# Patient Record
Sex: FEMALE | Race: WHITE | Employment: OTHER | URBAN - METROPOLITAN AREA
[De-identification: names, ages, dates, MRNs, and addresses within clinical notes are randomized per-mention and may not be internally consistent; named-entity substitution may affect disease eponyms.]

---

## 2022-04-11 ENCOUNTER — HOSPITAL ENCOUNTER (OUTPATIENT)
Dept: PHYSICAL THERAPY | Age: 57
Discharge: HOME | End: 2022-04-11
Payer: OTHER GOVERNMENT

## 2022-04-11 PROCEDURE — 97161 PT EVAL LOW COMPLEX 20 MIN: CPT

## 2022-04-11 PROCEDURE — 97110 THERAPEUTIC EXERCISES: CPT

## 2022-04-11 NOTE — PROGRESS NOTES
PT DAILY TREATMENT NOTE/HIP [id number]    Patient Name: Jeanine Glasgow  Date:2022  : 1965  [x]  Patient  Verified  Payor: LAUREN / Plan: Gamaliel Cole 74 / Product Type: Ricardo Andersen /    In time:1350  Out time:1450  Total Treatment Time (min): 60  Visit #: 1 of 16    Medicare/BCBS Only   Total Timed Codes (min):  23 1:1 Treatment Time:  60     Treatment Area: Pain in right hip [M25.551]    SUBJECTIVE  Pain Level (0-10 scale): 3  [x]constant []intermittent []improving []worsening []no change since onset    Any medication changes, allergies to medications, adverse drug reactions, diagnosis change, or new procedure performed?: [x] No    [] Yes (see summary sheet for update)  Subjective functional status/changes:       Patient presents with c/o right hip pain that is becoming more frequent over the last few months with no know mechanism of injury. Patient describes pain as ache, sharp. Pain is worse in PM. Denies numbness/tingling. Denies popping/clicking. Aggravating factors: stairs, sleeping. Alleviating factors: None. Denies red flags: SOB, chest pain, dizziness/lightheadedness, blurred/double vision, HA, chills/fevers, night sweats, change in bowel/bladder control, abdominal pain, difficulty swallowing, slurred speech, unexplained weight gain/loss, nausea, vomiting. PMHx: HTN, Visual impaired. Surgical Hx: . Social Hx: Lives with spouse in a single story with 3 stairs to enter home, work status: Retired. PLOF: walking, gardening yard work.  Diagnostic Imaging: Right hip xray: results unknown    OBJECTIVE/EXAMINATION    37 min [x]Eval                  []Re-Eval     23 min Therapeutic Exercise:  [x] See flow sheet :   Rationale: increase ROM, increase strength and decrease pain to improve the patients ability to complete ADLs       With   [x] TE   [] TA   [] neuro   [] other: Patient Education: [x] Review HEP    [] Progressed/Changed HEP based on:   [] positioning   [] body mechanics   [] transfers   [] heat/ice application    [] other:        Physical Therapy Evaluation- Hip    Posture: WNLs     Gait:  [] Normal    [] Abnormal    [x] Antalgic    [] NWB    Device:None      Describe:Patient ambulates with independence demonstrating an antalgic gait pattern. ROM/Strength        AROM               Strength (1-5)  Hip Left Right Left Right   Flexion   4 4-   Extension       Abduction   4 4-   Adduction   4 4   ER 45 45 4- 4   IR 45 30 4- 4   Knee Left Right Left Right   Extension   4 4   Flexion   4 4                                   Palpation  [] Min  [x] Mod  [] Severe    Location: right hip flexors/adductors    Optional Tests  Samir/ Suhas Test: [] Neg    [x] Pos  Hugo Test:  [] Neg    [x] Pos  Trendelenberg:  [x] Neg    [] Pos [] Left    [] Right  OberTest:   [x] Neg    [] Pos  Ely's Test:  [x] Neg    [] Pos    Other tests/ comments:      Pain Level (0-10 scale) post treatment: 1-2    ASSESSMENT/Changes in Function:     Patient presents with c/o right hip pain that is becoming more frequent over the last few months with no know mechanism of injury. She has decreased strength in bilateral LEs right more than left. She has reduced balance on the right secondary to right hip weakness. Patient ambulates with independence demonstrating an antalgic gait pattern as she had reduced stance time on the right and decreased step length on the left. Patient presentation is consistent with hip flexor tendinopathy. Patient will benefit from skilled PT services to modify and progress therapeutic interventions, address functional mobility deficits, address ROM deficits, address strength deficits, analyze and address soft tissue restrictions, analyze and cue movement patterns, analyze and modify body mechanics/ergonomics and assess and modify postural abnormalities to attain her goals.      [x]  See Plan of Care  []  See progress note/recertification  []  See Discharge Summary         Progress towards goals / Updated goals:  See POC    PLAN  []  Upgrade activities as tolerated     [x]  Continue plan of care  []  Update interventions per flow sheet       []  Discharge due to:_  []  Other:_      Tra Armas PT, DPT 4/11/2022  1:42 PM

## 2022-04-11 NOTE — PROGRESS NOTES
[facility] at [address] [place] [address] Drive: [phone]   Fax: [phone]  PLAN OF CARE / [address] [place] [address] PHYSICAL THERAPY SERVICES  Patient Name: Leslie Mcdonough : 1965   Medical   Diagnosis: Pain in right hip [M25.551] Treatment Diagnosis: Right hip pain   Onset Date: Chronic     Referral Source: Hilaria Nunez NP Start of Care [facility]): 2022   Prior Hospitalization: See medical history Provider #: [id number]   Prior Level of Function: walking, gardening yard work   Comorbidities: HTN, Visual impaired   Medications: Verified on Patient Summary List   The Plan of Care and following information is based on the information from the initial evaluation.   ===========================================================================================  Assessment / key information:  Leslie Mcdonough is a 64 y.o. female presents with  c/o right hip pain that is becoming more frequent over the last few months with no known mechanism of injury. She has decreased strength in bilateral LEs right more than left. She has reduced balance on the right secondary to right hip weakness. Patient ambulates with independence demonstrating an antalgic gait pattern as she had reduced stance time on the right and decreased step length on the left. Patient presentation is consistent with hip flexor tendinopathy. Patient will benefit from skilled PT services to modify and progress therapeutic interventions, address functional mobility deficits, address ROM deficits, address strength deficits, analyze and address soft tissue restrictions, analyze and cue movement patterns, analyze and modify body mechanics/ergonomics and assess and modify postural abnormalities to attain her goals. .      ===========================================================================================  Eval Complexity: History MEDIUM  Complexity : 1-2 comorbidities / personal factors will impact the outcome/ POC ;  Examination  LOW Complexity : 1-2 Standardized tests and measures addressing body structure, function, activity limitation and / or participation in recreation ; Presentation LOW Complexity : Stable, uncomplicated ;  Decision Making LOW Complexity : FOTO score of ; Overall Complexity LOW   Problem List: pain affecting function, decrease ROM, decrease strength, impaired gait/ balance, decrease ADL/ functional abilitiies, decrease activity tolerance, decrease flexibility/ joint mobility and decrease transfer abilities   Treatment Plan may include any combination of the following: Therapeutic exercise, Therapeutic activities, Neuromuscular re-education, Physical agent/modality, Gait/balance training, Manual therapy, Patient education, Self Care training, Functional mobility training, Home safety training and Stair training  Patient / Family readiness to learn indicated by: asking questions, trying to perform skills and interest  Persons(s) to be included in education: patient (P)  Barriers to Learning/Limitations: no  Measures taken if barriers to learning:   Patient Goal (s): \"reduce pain\"   Patient self reported health status: good  Rehabilitation Potential: good    Goals:  Short Term Goals: To be accomplished in 4 weeks:   Patient will report compliance with HEP 1x/day to aid in rehabilitation program.   Status at IE:Provided initial HEP   Current:Same as IE     Long Term Goals: To be accomplished in 8 weeks:   Patient will increase bilateral LE strength to 5/5 MMT throughout to aid in completion of ADLs. Status at IE:4-/5   Current:Same as IE     Patient will report pain no greater than 1-2/10 throughout entire day to aid in completion of ADLs   Status at IE:3-7/10   Current:Same as IE     Patent will perform 10 sit<>stands with 11# pain-free to demonstrate improvement in status and aid and completion of ADLs.    Status at IE:3/10   Current:Same as IE     Patient will improve FOTO score to 88 points to demonstrate improvement in functional status. Status at IE:FOTO score = 85 (an established functional score where 100 = no disability)   Current:Same as IE    Frequency / Duration:   Patient to be seen 2  times per week for 8  weeks:  Patient / Caregiver education and instruction: self care and exercises      Therapist Signature: Jaja Holland PT, DPT Date: 4/62/4129   Certification Period: NA Time: 3:10 PM   ===========================================================================================  I certify that the above Physical Therapy Services are being furnished while the patient is under my care. I agree with the treatment plan and certify that this therapy is necessary. Physician Signature:        Date:       Time:     Please sign and return to [facility] or you may fax the signed copy to [phone]. Thank you.

## 2022-04-14 ENCOUNTER — HOSPITAL ENCOUNTER (OUTPATIENT)
Dept: PHYSICAL THERAPY | Age: 57
Discharge: HOME | End: 2022-04-14
Payer: OTHER GOVERNMENT

## 2022-04-14 PROCEDURE — 97112 NEUROMUSCULAR REEDUCATION: CPT

## 2022-04-14 PROCEDURE — 97110 THERAPEUTIC EXERCISES: CPT

## 2022-04-14 PROCEDURE — 97530 THERAPEUTIC ACTIVITIES: CPT

## 2022-04-14 NOTE — PROGRESS NOTES
PT DAILY TREATMENT NOTE - [facility]     Patient Name: Sharon Allison  Date:2022  : 1965  [x]  Patient  Verified  Payor: LAUREN / Plan: Gamaliel Cole 74 / Product Type: [address] [place] [address] /    In LYOJ:8495  Out time:0850  Total Treatment Time (min): 54  Total Timed Codes (min): 47   Visit #: 2 of 16    Treatment Area: Pain in right hip [M25.551]    SUBJECTIVE  Pain Level (0-10 scale): 0-1  Any medication changes, allergies to medications, adverse drug reactions, diagnosis change, or new procedure performed?: [x] No    [] Yes (see summary sheet for update)  Subjective functional status/changes:   [] No changes reported  Patient rpeorts that    OBJECTIVE    30 min Therapeutic Exercise:  [x] See flow sheet :   Rationale: increase ROM, increase strength and decrease pain to improve the patients ability to complete ADLs    14 min Therapeutic Activity:  [x]  See flow sheet :   Rationale: increase ROM, increase strength and improve coordination  to improve the patients ability to complete ADLs     10 min Neuromuscular Re-education:  [x]  See flow sheet :   Rationale: improve coordination, improve balance and increase proprioception  to improve the patients ability to complete ADLs        With   [x] TE   [] TA   [] neuro   [] other: Patient Education: [x] Review HEP    [] Progressed/Changed HEP based on:   [] positioning   [] body mechanics   [] transfers   [] heat/ice application    [] other:      Other Objective/Functional Measures: NA     Pain Level (0-10 scale) post treatment: 0    ASSESSMENT/Changes in Function: Patient responds well to treatment session. Patient required cues for activity pacing to promote target muscle recovery. She was challenged with single limb weight bearing exercise secondary to reduced strength of right hip and LE. Reviewed HEP to promote good carryover at home.  No adverse effects were noted from today's treatment session    Patient will continue to benefit from skilled PT services to modify and progress therapeutic interventions, address functional mobility deficits, address ROM deficits, address strength deficits, analyze and address soft tissue restrictions, analyze and cue movement patterns, analyze and modify body mechanics/ergonomics, assess and modify postural abnormalities, address imbalance and instruct in home and community integration to attain remaining goals. []  See Plan of Care  []  See progress note/recertification  []  See Discharge Summary         Progress towards goals / Updated goals:  Short Term Goals: To be accomplished in 4 weeks:                 Patient will report compliance with HEP 1x/day to aid in rehabilitation program.                 Status at IE:Provided initial HEP                 Current: In-progress, reviewed HEP, 4/14/2022     Long Term Goals: To be accomplished in 8 weeks:                 Patient will increase bilateral LE strength to 5/5 MMT throughout to aid in completion of ADLs. Status at IE:4-/5                 Current:Same as IE                    Patient will report pain no greater than 1-2/10 throughout entire day to aid in completion of ADLs                 Status at IE:3-7/10                 Current:Same as IE                    Patent will perform 10 sit<>stands with 11# pain-free to demonstrate improvement in status and aid and completion of ADLs. Status at IE:3/10                 Current:Same as IE                    Patient will improve FOTO score to 88 points to demonstrate improvement in functional status.                  Status at IE:FOTO score = 85 (an established functional score where 100 = no disability)                 Current:Same as IE    PLAN  []  Upgrade activities as tolerated     [x]  Continue plan of care  []  Update interventions per flow sheet       []  Discharge due to:_  []  Other:_      Arlette Hoskins, PT, DPT 4/14/2022  7:57 AM    Future Appointments   Date Time Provider [facility]   4/14/2022  8:00 AM Aleene Billing, PT [facility]   4/18/2022  2:30 PM Aleene Billing, PT [facility]   4/21/2022  8:00 AM Wylene Milder, PT [facility]   4/25/2022  9:30 AM Aleene Billing, PT [facility]   4/28/2022  8:00 AM Wylene Milder, PT [facility]   5/6/2022  8:00 AM Wylene Milder, PT [facility]   5/9/2022  8:00 AM Aleene Billing, PT [facility]   5/12/2022  8:00 AM Aleene Billing, PT [facility]   5/16/2022  8:00 AM Aleene Billing, PT [facility]   5/19/2022  8:00 AM Aleene Billing, PT [facility]   5/23/2022  8:00 AM Aleene Billing, PT [facility]   5/26/2022  8:00 AM Aleene Billing, PT [facility]

## 2022-04-18 ENCOUNTER — HOSPITAL ENCOUNTER (OUTPATIENT)
Dept: PHYSICAL THERAPY | Age: 57
Discharge: HOME | End: 2022-04-18
Payer: OTHER GOVERNMENT

## 2022-04-18 PROCEDURE — 97110 THERAPEUTIC EXERCISES: CPT

## 2022-04-18 PROCEDURE — 97112 NEUROMUSCULAR REEDUCATION: CPT

## 2022-04-18 PROCEDURE — 97530 THERAPEUTIC ACTIVITIES: CPT

## 2022-04-18 NOTE — PROGRESS NOTES
PT DAILY TREATMENT NOTE - [facility]     Patient Name: Alida Smith  Date:2022  : 1965  [x]  Patient  Verified  Payor:  / Plan: Fabricio Tyler / Product Type:  /    In time:1430  Out time:1525  Total Treatment Time (min): 55  Total Timed Codes (min): 55     Visit #: 3 of 16    Treatment Area: Pain in right hip [M25.551]    SUBJECTIVE  Pain Level (0-10 scale): 1  Any medication changes, allergies to medications, adverse drug reactions, diagnosis change, or new procedure performed?: [x] No    [] Yes (see summary sheet for update)  Subjective functional status/changes:   [] No changes reported    Patient reports that she had right hip soreness after last soreness but it went away after a day. OBJECTIVE    30 min Therapeutic Exercise:  [x] See flow sheet :   Rationale: increase ROM, increase strength and decrease pain to improve the patients ability to complete ADLs    15 min Therapeutic Activity:  [x]  See flow sheet :   Rationale: increase ROM, increase strength and improve coordination  to improve the patients ability to complete ADLs     10 min Neuromuscular Re-education:  [x]  See flow sheet :   Rationale: improve coordination, improve balance and increase proprioception  to improve the patients ability to complete ADLs        With   [x] TE   [] TA   [] neuro   [] other: Patient Education: [x] Review HEP    [] Progressed/Changed HEP based on:   [] positioning   [] body mechanics   [] transfers   [] heat/ice application    [] other:      Other Objective/Functional Measures: NA     Pain Level (0-10 scale) post treatment: 0    ASSESSMENT/Changes in Function: Patient responds well to treatment session. Patient required cues to recall exercise parameters. She responded well to exercise as she had no increase in symptoms. She was challenged with posterior chain strengthening exercise. Will advance exercise next visit by introducing leg press.  No adverse effects were noted from today's treatment session    Patient will continue to benefit from skilled PT services to modify and progress therapeutic interventions, address functional mobility deficits, address ROM deficits, address strength deficits, analyze and address soft tissue restrictions, analyze and cue movement patterns, analyze and modify body mechanics/ergonomics, assess and modify postural abnormalities, address imbalance and instruct in home and community integration to attain remaining goals. []  See Plan of Care  []  See progress note/recertification  []  See Discharge Summary         Progress towards goals / Updated goals:  Short Term Goals: To be accomplished in 4 weeks:                 CQHIFPD will report compliance with HEP 1x/day to aid in rehabilitation program.                 Status at IE:Provided initial HEP                 PXDRULP: In-progress, reviewed HEP, 4/14/2022     Long Term Goals: To be accomplished in 8 weeks:                 AEHOYDV will increase bilateral LE strength to 5/5 MMT throughout to aid in completion of ADLs.                Status at IE:4-/5                 Current:Same as IE                    Patient will report pain no greater than 1-2/10 throughout entire day to aid in completion of ADLs                 Status at IE:3-7/10                 Current:Same as IE                    Patent will perform 10 sit<>stands with 11# pain-free to demonstrate improvement in status and aid and completion of ADLs.                Status at IE:3/10                 Current:Same as IE                    Patient will improve FOTO score to 88 points to demonstrate improvement in functional status.                  Status at IE:FOTO score = 85 (an established functional score where 100 = no disability)                 Current:Same as IE    PLAN  []  Upgrade activities as tolerated     [x]  Continue plan of care  []  Update interventions per flow sheet       []  Discharge due to:_  []  Other:Michael Culver Lamont Rodriguez, CONSUELO 4/18/2022  3:01 PM    Future Appointments   Date Time Provider Marlo Leesa   4/21/2022  8:00 AM Eleanor Vega, PT [facility]   4/25/2022  9:30 AM Truefranklin Mccain, PT [facility]   4/28/2022  8:00 AM Mitch New, PT [facility]   5/6/2022  8:00 AM Mitch New, PT [facility]   5/9/2022  8:00 AM Truett Champion, PT [facility]   5/12/2022  8:00 AM Truett Champion, PT [facility]   5/16/2022  8:00 AM Truett Champion, PT [facility]   5/19/2022  8:00 AM Truett Champion, PT [facility]   5/23/2022  8:00 AM Truett Champion, PT [facility]   5/26/2022  8:00 AM Truett Champion, PT [facility]

## 2022-04-21 ENCOUNTER — HOSPITAL ENCOUNTER (OUTPATIENT)
Dept: PHYSICAL THERAPY | Age: 57
Discharge: HOME | End: 2022-04-21
Payer: OTHER GOVERNMENT

## 2022-04-21 PROCEDURE — 97110 THERAPEUTIC EXERCISES: CPT

## 2022-04-21 PROCEDURE — 97112 NEUROMUSCULAR REEDUCATION: CPT

## 2022-04-21 PROCEDURE — 97530 THERAPEUTIC ACTIVITIES: CPT

## 2022-04-21 NOTE — PROGRESS NOTES
PT DAILY TREATMENT NOTE    Patient Name: Jamie Angela  Date:2022  : 1965  [x]  Patient  Verified  Payor: LAUREN / Plan: Gamaliel Cole 74 / Product Type:  /    In time: 550  Out time: 842  Total Treatment Time (min): 45  Total Timed Codes (min): 45  1:1 Treatment Time ([facility] only): 39   Visit #: 4 of 16    Treatment Dx: Pain in right hip [M25.551]    SUBJECTIVE  Pain Level (0-10 scale): 0  Any medication changes, allergies to medications, adverse drug reactions, diagnosis change, or new procedure performed?: [x] No    [] Yes (see summary sheet for update)  Subjective functional status/changes:   [] No changes reported  \"I haven't had much pain at all the past day or so. \"    OBJECTIVE    20 min Therapeutic Exercise:  [x] See flow sheet :   Rationale: increase ROM, increase strength and decrease pain to improve the patients ability to complete ADLs  ambulation safety and efficiency in order to improve patient's ability to safely ambulate at home for self care. 15 min Therapeutic Activity:  [x]  See flow sheet :   Rationale: increase ROM, increase strength and improve coordination  to improve the patients ability to Complete ADLS     10 min Neuromuscular Re-education:  [x]  See flow sheet :   Rationale: improve coordination, improve balance and increase proprioception  to improve the patients ability to complete ADLS, and decrease falls risk    With   [] TE   [] TA   [] neuro   [] other: Patient Education: [x] Review HEP    [] Progressed/Changed HEP based on:   [] positioning   [] body mechanics   [] transfers   [] heat/ice application    [] other:      Other Objective/Functional Measures: NA     Pain Level (0-10 scale) post treatment: 0    ASSESSMENT/Changes in Function: Patient able to perform all exercises without any recurrence of symptoms. Patient reports decreased intensity of episodic right hip pain with ADLs. Patient responds well to treatment session.   No adverse effects were noted from today's treatment session. Patient will continue to benefit from skilled PT services to modify and progress therapeutic interventions, address functional mobility deficits, address ROM deficits, address strength deficits, analyze and address soft tissue restrictions, analyze and cue movement patterns, analyze and modify body mechanics/ergonomics, assess and modify postural abnormalities,  and instruct in home and community integration to attain remaining goals. [x]  See Plan of Care  []  See progress note/recertification  []  See Discharge Summary         Progress towards goals / Updated goals:  Short Term Goals: To be accomplished in 4 weeks:                 LEILALVGONZALES will report compliance with HEP 1x/day to aid in rehabilitation program.                 Status at IE:Provided initial HEP                 Current: In-progress, reviewed HEP, 4/14/2022     Long Term Goals: To be accomplished in 8 weeks:                 [facility] bilateral LE strength to 5/5 MMT throughout to aid in completion of ADLs.                Status at IE:4-/5                 Current:Same as IE                    Patient will report pain no greater than 1-2/10 throughout entire day to aid in completion of ADLs                 Status at IE:3-7/10                 Current:Patient reports pain now 1-4/10.    4/21/2022, in progress                    Patent will perform 10 sit<>stands with 11# pain-free to demonstrate improvement in status and aid and completion of ADLs.                Status at IE:3/10                 Current:Same as IE                    Patient will improve FOTO score to 88 points to demonstrate improvement in functional status.                  Status at IE:FOTO score = 85 (an established functional score where 100 = no disability)                 Current:Same as IE    PLAN  []  Upgrade activities as tolerated     [x]  Continue plan of care  []  Update interventions per flow sheet       [] Discharge due to:_  []  Other:_      Pavan Hernández, PT 4/21/2022  8:07 AM    Future Appointments   Date Time Provider [facility] Landers   4/25/2022  9:30 AM Truett Pomona, PT [facility]   4/28/2022  8:00 AM Eleanor Vega, PT [facility]   5/6/2022  8:00 AM Jonathan Duong, PT [facility]   5/9/2022  8:00 AM Truett Pomona, PT [facility]   5/12/2022  8:00 AM Truett Pomona, PT [facility]   5/16/2022  8:00 AM Truett Pomona, PT [facility]   5/19/2022  8:00 AM Truett Pomona, PT [facility]   5/23/2022  8:00 AM Truett Pomona, PT [facility]   5/26/2022  8:00 AM Truett Pomona, PT [facility]

## 2022-04-25 ENCOUNTER — HOSPITAL ENCOUNTER (OUTPATIENT)
Dept: PHYSICAL THERAPY | Age: 57
Discharge: HOME | End: 2022-04-25
Payer: OTHER GOVERNMENT

## 2022-04-25 PROCEDURE — 97530 THERAPEUTIC ACTIVITIES: CPT

## 2022-04-25 PROCEDURE — 97110 THERAPEUTIC EXERCISES: CPT

## 2022-04-25 PROCEDURE — 97112 NEUROMUSCULAR REEDUCATION: CPT

## 2022-04-25 NOTE — PROGRESS NOTES
PT DAILY TREATMENT NOTE - [facility]     Patient Name: Andreas Diez  Date:2022  : 1965  [x]  Patient  Verified  Payor: LAUREN / Plan: Gamaliel Cole 74 / Product Type: Porter [employer]ing /    In LAUN:9821  Out time:1023  Total Treatment Time (min): 56  Total Timed Codes (min): 56   Visit #: 5 of 16    Treatment Area: Pain in right hip [M25.551]    SUBJECTIVE  Pain Level (0-10 scale): 0-1  Any medication changes, allergies to medications, adverse drug reactions, diagnosis change, or new procedure performed?: [x] No    [] Yes (see summary sheet for update)  Subjective functional status/changes:   [] No changes reported  Patient reports that she was busy this weekend and has hip soreness this morning. OBJECTIVE    35 min Therapeutic Exercise:  [x] See flow sheet :   Rationale: increase ROM, increase strength and decrease pain to improve the patients ability to complete ADLs    13 min Therapeutic Activity:  [x]  See flow sheet :   Rationale: increase ROM, increase strength and improve coordination  to improve the patients ability to complete ADLs     10 min Neuromuscular Re-education:  [x]  See flow sheet :   Rationale: improve coordination, improve balance and increase proprioception  to improve the patients ability to complete ADLs       With   [x] TE   [] TA   [] neuro   [] other: Patient Education: [x] Review HEP    [] Progressed/Changed HEP based on:   [] positioning   [] body mechanics   [] transfers   [] heat/ice application    [] other:      Other Objective/Functional Measures: NA     Pain Level (0-10 scale) post treatment: 0    ASSESSMENT/Changes in Function: Patient responds well to treatment session. Advanced exercise  by increasing resistance and patient was challenged with exercise prescribed. Introduced split squats to isolate quad and glutes reducing compensatory strategies. Will introduce gym exercise next visit as patient is transitioning toward independent exercise.  No adverse effects were noted from today's treatment session      Patient will continue to benefit from skilled PT services to modify and progress therapeutic interventions, address functional mobility deficits, address ROM deficits, address strength deficits, analyze and address soft tissue restrictions, analyze and cue movement patterns, analyze and modify body mechanics/ergonomics, assess and modify postural abnormalities, address imbalance and instruct in home and community integration to attain remaining goals. []  See Plan of Care  []  See progress note/recertification  []  See Discharge Summary         Progress towards goals / Updated goals:  Short Term Goals: To be accomplished in 4 weeks:                 [facility] compliance with [facility] 1x/day to aid in rehabilitation program.                 Status at IE:Provided initial HEP                 Current: Met, 4/25/2022     Long Term Goals: To be accomplished in 8 weeks:                 NGKTTJM will increase bilateral LE strength to 5/5 MMT throughout to aid in completion of ADLs.                Status at IE:4-/5                 Current:Same as IE                    Patient will report pain no greater than 1-2/10 throughout entire day to aid in completion of ADLs                 Status at IE:3-7/10                 Current:Patient reports pain now 1-4/10.    4/21/2022, in progress                    Patent will perform 10 sit<>stands with 11# pain-free to demonstrate improvement in status and aid and completion of ADLs.                Status at IE:3/10                 Current:Same as IE                    Patient will improve FOTO score to 88 points to demonstrate improvement in functional status.                  Status at IE:FOTO score = 85 (an established functional score where 100 = no disability)                 Current:Same as IE    PLAN  []  Upgrade activities as tolerated     [x]  Continue plan of care  []  Update interventions per flow sheet       [] Discharge due to:_  []  Other:_      Celine Díaz, PT, DPT 4/25/2022  11:20 AM    Future Appointments   Date Time Provider Marlo Landers   4/28/2022  8:00 AM Ashlie Star, PT [facility]   5/6/2022  8:00 AM Birgit Duong, PT [facility]   5/9/2022  8:00 AM Ashlie Star, PT [facility]   5/12/2022  8:00 AM Ashlie Star, PT [facility]   5/16/2022  8:00 AM Ashlie Star, PT [facility]   5/19/2022  8:00 AM Ashlie Star, PT [facility]   5/23/2022  8:00 AM Ashlie Star, PT [facility]   5/26/2022  8:00 AM Ashlie Star, PT [facility]

## 2022-04-28 ENCOUNTER — HOSPITAL ENCOUNTER (OUTPATIENT)
Dept: PHYSICAL THERAPY | Age: 57
Discharge: HOME | End: 2022-04-28
Payer: OTHER GOVERNMENT

## 2022-04-28 PROCEDURE — 97112 NEUROMUSCULAR REEDUCATION: CPT

## 2022-04-28 PROCEDURE — 97530 THERAPEUTIC ACTIVITIES: CPT

## 2022-04-28 PROCEDURE — 97110 THERAPEUTIC EXERCISES: CPT

## 2022-04-28 NOTE — PROGRESS NOTES
PT DAILY TREATMENT NOTE - [facility]     Patient Name: Kim Villafana  Date:2022  : 1965  [x]  Patient  Verified  Payor: LAUREN / Plan: Milton Fabian / Product Type: [address] [place] [address] /    In time:0800  Out time:0900  Total Treatment Time (min): 60  Total Timed Codes (min): 60   Visit #: 6 of 16    Treatment Area: Pain in right hip [M25.551]    SUBJECTIVE  Pain Level (0-10 scale): 0-1  Any medication changes, allergies to medications, adverse drug reactions, diagnosis change, or new procedure performed?: [x] No    [] Yes (see summary sheet for update)  Subjective functional status/changes:   [] No changes reported    Patient reports that she had quad soreness on the right after last visit. OBJECTIVE    30 min Therapeutic Exercise:  [x] See flow sheet :   Rationale: increase ROM, increase strength and decrease pain to improve the patients ability to complete ADLs    20 min Therapeutic Activity:  [x]  See flow sheet :   Rationale: increase ROM, increase strength and improve coordination  to improve the patients ability to complete ADLs     10 min Neuromuscular Re-education:  [x]  See flow sheet :   Rationale: improve coordination, improve balance and increase proprioception  to improve the patients ability to complete ADLs          With   [x] TE   [] TA   [] neuro   [] other: Patient Education: [x] Review HEP    [] Progressed/Changed HEP based on:   [] positioning   [] body mechanics   [] transfers   [] heat/ice application    [] other:      Other Objective/Functional Measures: NA     Pain Level (0-10 scale) post treatment: 0    ASSESSMENT/Changes in Function:   Patient responds well to treatment session. Advanced patient exercise by introducing gym based activities as she is transitioning toward independent exercise. Patient required close supervision during balance exercise for safety. She continues to report reduction in symptoms with hip flexor stretch. Will advance exercise as tolerated.  No adverse effects were noted from today's treatment session    Patient will continue to benefit from skilled PT services to modify and progress therapeutic interventions, address functional mobility deficits, address ROM deficits, address strength deficits, analyze and address soft tissue restrictions, analyze and cue movement patterns, analyze and modify body mechanics/ergonomics, assess and modify postural abnormalities, address imbalance and instruct in home and community integration to attain remaining goals. []  See Plan of Care  []  See progress note/recertification  []  See Discharge Summary         Progress towards goals / Updated goals:  Short Term Goals: To be accomplished in 4 weeks:                 [id number] will report compliance with HEP 1x/day to aid in rehabilitation program.                 Status at IE:Provided initial HEP                 Current: Met, 4/25/2022     Long Term Goals: To be accomplished in 8 weeks:                 JUAN will increase bilateral LE strength to 5/5 MMT throughout to aid in completion of ADLs.                Status at IE:4-/5                 Current:Same as IE                    Patient will report pain no greater than 1-2/10 throughout entire day to aid in completion of ADLs                 Status at IE:3-7/10                 Current:Patient reports pain now 1-4/10.    4/21/2022, in progress                    Patent will perform 10 sit<>stands with 11# pain-free to demonstrate improvement in status and aid and completion of ADLs.                Status at IE:3/10                 Current: In-progress, 2 x10 with 11# and 1/10 pain, 4/28/2022                    Patient will improve FOTO score to 88 points to demonstrate improvement in functional status.                  Status at IE:FOTO score = 85 (an established functional score where 100 = no disability)                 Current:Same as IE    PLAN  []  Upgrade activities as tolerated     [x]  Continue plan of care  []  Update interventions per flow sheet       []  Discharge due to:_  []  Other:_      Rosa Dennison, PT, DPT 4/28/2022  8:01 AM    Future Appointments   Date Time Provider Marlo Landers   5/6/2022  8:00 AM Kapil Duong, PT [facility]   5/9/2022  8:00 AM Tanya Carbon, PT [facility]   5/12/2022  8:00 AM Tanya Carbon, PT [facility]   5/16/2022  8:00 AM Tanya Carbon, PT [facility]   5/19/2022  8:00 AM Tanya Carbon, PT [facility]   5/23/2022  8:00 AM Tanya Carbon, PT [facility]   5/26/2022  8:00 AM Tanya Carbon, PT [facility]

## 2022-05-06 ENCOUNTER — HOSPITAL ENCOUNTER (OUTPATIENT)
Dept: PHYSICAL THERAPY | Age: 57
Discharge: HOME | End: 2022-05-06
Payer: OTHER GOVERNMENT

## 2022-05-06 PROCEDURE — 97112 NEUROMUSCULAR REEDUCATION: CPT

## 2022-05-06 PROCEDURE — 97530 THERAPEUTIC ACTIVITIES: CPT

## 2022-05-06 PROCEDURE — 97110 THERAPEUTIC EXERCISES: CPT

## 2022-05-06 NOTE — PROGRESS NOTES
PT DAILY TREATMENT NOTE    Patient Name: Yesenia Simon  Date:2022  : 1965  [x]  Patient  Verified  Payor:  / Plan: Omi Shear / Product Type:  /    In time: 8003  Out time: 849  Total Treatment Time (min): 53  Total Timed Codes (min): 53  1:1 Treatment Time (MC only): 48   Visit #: 6 of 17    Treatment Dx: Pain in right hip [M25.551]    SUBJECTIVE  Pain Level (0-10 scale): 0  Any medication changes, allergies to medications, adverse drug reactions, diagnosis change, or new procedure performed?: [x] No    [] Yes (see summary sheet for update)  Subjective functional status/changes:   [] No changes reported  \"I am still intermittently getting the sharp pain on sit to stand. But, I am seeing other improvements. My balance is definitely getting better. \"    OBJECTIVE    30 min Therapeutic Exercise:  [x] See flow sheet :   Rationale: increase ROM, increase strength and decrease pain to improve the patients ability to complete ADLs  ambulation safety and efficiency in order to improve patient's ability to safely ambulate at home for self care. 13 min Therapeutic Activity:  [x]  See flow sheet :   Rationale: increase ROM, increase strength and improve coordination  to improve the patients ability to Complete ADLS     10 min Neuromuscular Re-education:  [x]  See flow sheet :   Rationale: improve coordination, improve balance and increase proprioception  to improve the patients ability to complete ADLS, and decrease falls risk    With   [] TE   [] TA   [] neuro   [] other: Patient Education: [x] Review HEP    [] Progressed/Changed HEP based on:   [] positioning   [] body mechanics   [] transfers   [] heat/ice application    [] other:      Other Objective/Functional Measures: NA     Pain Level (0-10 scale) post treatment: 0    ASSESSMENT/Changes in Function: Patient reports noting functional improvements in both balance and LE strength and she performs her routine ADLs. Patient provided upgraded resistance band (green, level 3) to advance HEP. Patient responds well to treatment session. No adverse effects were noted from today's treatment session. Patient will continue to benefit from skilled PT services to modify and progress therapeutic interventions, address functional mobility deficits, address ROM deficits, address strength deficits, analyze and address soft tissue restrictions, analyze and cue movement patterns, analyze and modify body mechanics/ergonomics, assess and modify postural abnormalities,  and instruct in home and community integration to attain remaining goals. [x]  See Plan of Care  []  See progress note/recertification  []  See Discharge Summary         Progress towards goals / Updated goals:  Short Term Goals: To be accomplished in 4 weeks:                 CDVACJX will report compliance with HEP 1x/day to aid in rehabilitation program.                 Status at IE:Provided initial HEP                 Current: Met, 4/25/2022     Long Term Goals: To be accomplished in 8 weeks:                 ZHCJVFN will increase bilateral LE strength to 5/5 MMT throughout to aid in completion of ADLs.                Status at IE:4-/5                 Current: improved to 4/5.     5/6/2022, in progress                    Patient will report pain no greater than 1-2/10 throughout entire day to aid in completion of ADLs                 Status at IE:3-7/10                 Current:Patient reports pain now 1-4/10.    4/21/2022, in progress                    Patent will perform 10 sit<>stands with 11# pain-free to demonstrate improvement in status and aid and completion of ADLs.                Status at IE:3/10                 Current: In-progress, 2 x10 with 11# and 1/10 pain, 4/28/2022                    Patient will improve FOTO score to 88 points to demonstrate improvement in functional status.                  Status at IE:FOTO score = 85 (an established functional score where 100 = no disability)                 Current:Same as IE    PLAN  []  Upgrade activities as tolerated     [x]  Continue plan of care  []  Update interventions per flow sheet       []  Discharge due to:_  []  Other:_      Tierra Herrera, PT 5/6/2022  8:03 AM    Future Appointments   Date Time Provider Marlo Landers   5/9/2022  8:00 AM Lugenia Fent, PT [facility]   5/12/2022  8:00 AM Lugenia Fent, PT [facility]   5/16/2022  8:00 AM Lugenia Fent, PT [facility]   5/19/2022  8:00 AM Lugenia Fent, PT [facility]   5/23/2022  8:00 AM Lugenia Fent, PT [facility]   5/26/2022  8:00 AM Lugenia Fent, PT [facility]

## 2022-05-09 ENCOUNTER — HOSPITAL ENCOUNTER (OUTPATIENT)
Dept: PHYSICAL THERAPY | Age: 57
Discharge: HOME | End: 2022-05-09
Payer: OTHER GOVERNMENT

## 2022-05-09 PROCEDURE — 97530 THERAPEUTIC ACTIVITIES: CPT

## 2022-05-09 PROCEDURE — 97110 THERAPEUTIC EXERCISES: CPT

## 2022-05-09 NOTE — PROGRESS NOTES
[facility] at [address] [place] [address] Drive: [phone]   Fax: [phone]  Discharge Summary  Patient Name: Indiana Beckford : 1965   Medical   Diagnosis: Pain in right hip [M25.551] Treatment Diagnosis: Right hip pain   Onset Date: Chronic     Referral Source: Mary Luo NP Start of Care [facility]): 2022   Prior Hospitalization: See medical history Provider #: [id number]   Prior Level of Function: walking, gardening yard work   Comorbidities: HTN, Visual impaired   Medications: Verified on Patient Summary List      ===========================================================================================  Assessment / Summary of Care:  Indiana Beckford is a 62 y.o. female c/o right hip pain. Patient is being discharged as she has met 100% of her short and long term goals. She has gained strength and functional mobility resulting in increased activity tolerance and improved balance. She is compliant with HEP and plans to reduce future episodes of care with continued participation in an independent exercise program. Provided advanced HEP and resistance band to promote seamless transition to independent exercise.    ===========================================================================================    Plan: Discharge to independent HEP. Goals: \Short Term Goals: To be accomplished in 4 weeks:                 HCRPRHI will report compliance with HEP 1x/day to aid in rehabilitation program.                 Status at IE:Provided initial HEP                 Current: Met, 2022     Long Term Goals: To be accomplished in 8 weeks:                 IMWEWRO will increase bilateral LE strength to 5/5 MMT throughout to aid in completion of ADLs.                  Status at IE:                 Current: Met , 2022                    Patient will report pain no greater than 1-2/10 throughout entire day to aid in completion of ADLs                 Status at IE:3-7/10                 Current:Met, 1-2/10, 5/9/2022                    Patent will perform 10 sit<>stands with 11# pain-free to demonstrate improvement in status and aid and completion of ADLs.                Status at IE:3/10                 Current: Met, 5/9/2022                    Patient will improve FOTO score to 88 points to demonstrate improvement in functional status.                Status at IE:FOTO score = 85 (an established functional score where 100 = no disability)                 Current: Met, 99, 5/9/2022    ===========================================================================================  Subjective: Patient reports that she is feeling stronger and has improved balance. She reports that hip pain is intermittent.      Objective:   MMT 5/5  FOTO 99  2 x 10 STS with 11#  0-2/10 pain throughout the day during ADLs            Therapist Signature: Satnam Fletcher PT, DPT Date: 5/9/2022     Time: 8:55 AM

## 2022-05-09 NOTE — PROGRESS NOTES
PT DAILY TREATMENT NOTE - [facility]     Patient Name: Alee Place  Date:2022  : 1965  [x]  Patient  Verified  Payor: LAUREN / Plan: Gamaliel Cole 74 / Product Type:  /    In OFRE:3386  Out WSFP:3649  Total Treatment Time (min): 38  Total Timed Codes (min): 38   Visit #: 7 of 17    Treatment Area: Pain in right hip [M25.551]    SUBJECTIVE  Pain Level (0-10 scale): 0  Any medication changes, allergies to medications, adverse drug reactions, diagnosis change, or new procedure performed?: [x] No    [] Yes (see summary sheet for update)  Subjective functional status/changes:   [] No changes reported    Patient reports that she is feeling stronger and has improved balance. She reports that hip pain is intermittent. OBJECTIVE    30 min Therapeutic Exercise:  [x] See flow sheet :   Rationale: increase ROM, increase strength and decrease pain to improve the patients ability to complete ADLs    8 min Therapeutic Activity:  [x]  See flow sheet :   Rationale: increase ROM, increase strength and improve coordination  to improve the patients ability to complete ADLs        With   [x] TE   [] TA   [] neuro   [] other: Patient Education: [x] Review HEP    [] Progressed/Changed HEP based on:   [] positioning   [] body mechanics   [] transfers   [] heat/ice application    [] other:      Other Objective/Functional Measures:   MMT 5/5  FOTO 99  2 x 10 STS with 11#  0-2/10 pain throughout the day during ADLs     Pain Level (0-10 scale) post treatment: 0    ASSESSMENT/Changes in Function: Patient responds well to treatment session. No adverse effects were noted from today's treatment session. Patient is being discharged as she has met 100% of her short and long term goals. She has gained strength and functional mobility resulting in increased activity tolerance and improved balance.  She is compliant with HEP and plans to reduce future episodes of care with continued participation in an independent exercise program. Provided advanced HEP and resistance band to promote seamless transition to independent exercise. []  See Plan of Care  []  See progress note/recertification  []  See Discharge Summary         Progress towards goals / Updated goals:  Short Term Goals: To be accomplished in 4 weeks:                 QADDQRP will report compliance with HEP 1x/day to aid in rehabilitation program.                 Status at IE:Provided initial HEP                 Current: Met, 4/25/2022     Long Term Goals: To be accomplished in 8 weeks:                 IUJOXPI will increase bilateral LE strength to 5/5 MMT throughout to aid in completion of ADLs.                Status at IE:4-/5                 Current: Met 5/5, 5/9/2022                    Patient will report pain no greater than 1-2/10 throughout entire day to aid in completion of ADLs                 Status at IE:3-7/10                 Current:Met, 1-2/10, 5/9/2022                    Patent will perform 10 sit<>stands with 11# pain-free to demonstrate improvement in status and aid and completion of ADLs.                Status at IE:3/10                 Current: Met, 5/9/2022                    Patient will improve FOTO score to 88 points to demonstrate improvement in functional status.                  Status at IE:FOTO score = 85 (an established functional score where 100 = no disability)                 Current: Met, 99, 5/9/2022    PLAN  []  Upgrade activities as tolerated     [x]  Continue plan of care  []  Update interventions per flow sheet       []  Discharge due to:_  []  Other:_      Deepthi Calvert PT, DPT 5/9/2022  8:02 AM    Future Appointments   Date Time Provider Marlo Landers   5/12/2022  8:00 AM KALLI Hill [facility]   5/16/2022  8:00 AM KALLI Hill [facility]   5/19/2022  8:00 AM KALLI Hill [facility]   5/23/2022  8:00 AM KALLI Hill [facility]   5/26/2022  8:00 AM KALLI Hill [facility]

## 2022-05-12 ENCOUNTER — APPOINTMENT (OUTPATIENT)
Dept: PHYSICAL THERAPY | Age: 57
End: 2022-05-12
Payer: OTHER GOVERNMENT

## 2022-05-16 ENCOUNTER — APPOINTMENT (OUTPATIENT)
Dept: PHYSICAL THERAPY | Age: 57
End: 2022-05-16
Payer: OTHER GOVERNMENT

## 2022-05-19 ENCOUNTER — APPOINTMENT (OUTPATIENT)
Dept: PHYSICAL THERAPY | Age: 57
End: 2022-05-19
Payer: OTHER GOVERNMENT

## 2022-05-23 ENCOUNTER — APPOINTMENT (OUTPATIENT)
Dept: PHYSICAL THERAPY | Age: 57
End: 2022-05-23
Payer: OTHER GOVERNMENT

## 2022-05-26 ENCOUNTER — APPOINTMENT (OUTPATIENT)
Dept: PHYSICAL THERAPY | Age: 57
End: 2022-05-26
Payer: OTHER GOVERNMENT

## 2022-09-13 ENCOUNTER — HOSPITAL ENCOUNTER (OUTPATIENT)
Dept: PHYSICAL THERAPY | Age: 57
Discharge: HOME | End: 2022-09-13
Payer: OTHER GOVERNMENT

## 2022-09-13 PROCEDURE — 97161 PT EVAL LOW COMPLEX 20 MIN: CPT

## 2022-09-13 PROCEDURE — 97110 THERAPEUTIC EXERCISES: CPT

## 2022-09-13 NOTE — PROGRESS NOTES
PT DAILY TREATMENT NOTE/KNEE EVAL 10-18    Patient Name: Tierney Palomino  Date:2022  : 1965  [x]  Patient  Verified  Payor:  / Plan: Gamaliel Cole 74 / Product Type:  /    In time:1315  Out time:1600  Total Treatment Time (min): 45  Visit #: 1 of 15    Medicare/BCBS Only   Total Timed Codes (min):  22 1:1 Treatment Time:  45     Treatment Area: Pain in left knee [M25.562]    SUBJECTIVE  Pain Level (0-10 scale): 5  [x]constant []intermittent []improving []worsening []no change since onset    Any medication changes, allergies to medications, adverse drug reactions, diagnosis change, or new procedure performed?: [x] No    [] Yes (see summary sheet for update)  Subjective functional status/changes:   Patient presents with c/o left knee pain since 2022 with no known mechanism of injury. Patient describes pain as sharp, stabbing, dull, tight, throbbing. Denies numbness/tingling. Denies popping/clicking. Aggravating factors:walking, sleeping, end range knee flexion and extension. Alleviating factors: flexing knee. Denies red flags: SOB, chest pain, dizziness/lightheadedness, blurred/double vision, HA, chills/fevers, night sweats, change in bowel/bladder control, abdominal pain, difficulty swallowing, slurred speech, unexplained weight gain/loss, nausea, vomiting. PMHx: HTN, Visual impaired. Surgical Hx: . Social Hx: Lives with spouse in a single story with 3 stairs to enter home, work status: Retired. PLOF: walking, gardening, yard work.  Diagnostic Imaging: Left knee tibiofemoral joint effusion     OBJECTIVE/EXAMINATION    24 min [x]Eval                  []Re-Eval     21 min Therapeutic Exercise:  [x] See flow sheet :   Rationale: increase ROM, increase strength and decrease pain to improve the patients ability to complete ADLs        With   - TE   [] TA   [] neuro   [] other: Patient Education: [x] Review HEP    [] Progressed/Changed HEP based on:   [] positioning   [] body mechanics   [] transfers   [] heat/ice application    [] other:        Physical Therapy Evaluation - Knee    Posture: Forward head and rounded shoulders    Gait:  [] Normal    [] Abnormal    [x] Antalgic    [] NWB    Device:none    Describe: Patient ambulates with independence demonstrating an antalgic gait pattern as she has reduced stance time on the left and decreased step length on the right. ROM / Strength  [] Unable to assess                  AROM              Strength (1-5)    Left Right Left Right   Hip Flexion   4- 4    Extension   4- 4    Abduction   4- 4    Adduction   4- 4   Knee Flexion 140 140 4- 4    Extension 3 0 4- 4   Ankle Plantarflexion   4- 4    Dorsiflexion   4- 4     Palpation:   Neg/Pos  Neg/Pos  Neg/Pos   Joint Line Pos Quad tendon Neg Patellar ligament Neg   Patella Neg Fibular head Neg Pes Anserinus Neg   Tibial tubercle Neg Hamstring tendons Neg Infrapatellar fat pad Neg     Optional Tests:  Patellar Mobility WNLs     Girth Measurements:     Cm at joint line   Left 35.5   Right  33.0     Lachmans  [x] Neg    [] Pos   Posterior Drawer [x] Neg    [] Pos  Pivot Shift  [x] Neg    [] Pos   Posterior Sag  [] Neg    [] Pos   Squat   [] Neg    [x] Pos  Maria Antonia-Rashel [] Neg    [x] Pos  Anterior Drawer [x] Neg    [] Pos     Other tests/comments:     Pain Level (0-10 scale) post treatment: 5    ASSESSMENT/Changes in Function:   Patient presents with c/o left knee pain since August 10th, 2022 with no known mechanism of injury. She is tender to palpation over left medial joint line of the tibiofemoral joint. She has reduced bilateral LE strength left more than right. She has reduced single limb balance. Patient ambulates with independence demonstrating an antalgic gait pattern as she has reduced stance time on the left and decreased step length on the right. Patient presentation may be consistent with left knee pain secondary to medial meniscal derangement.  Patient will benefit from skilled PT services to modify and progress therapeutic interventions, address functional mobility deficits, address ROM deficits, address strength deficits, analyze and address soft tissue restrictions, analyze and cue movement patterns, analyze and modify body mechanics/ergonomics and assess and modify postural abnormalities to attain her goals.      []  See Plan of Care  []  See progress note/recertification  []  See Discharge Summary         Progress towards goals / Updated goals:  See POC    PLAN  []  Upgrade activities as tolerated     [x]  Continue plan of care  []  Update interventions per flow sheet       []  Discharge due to:_  []  Other:_      Perlita Mckeon, PT, DPT 9/13/2022  1:14 PM

## 2022-09-13 NOTE — PROGRESS NOTES
[facility] at [address] [place] [address] Drive: [phone]   Fax: [phone]  PLAN OF CARE / [address] [place] [address] PHYSICAL THERAPY SERVICES  Patient Name: Nori Loya : 1965   Medical   Diagnosis: Pain in left knee [M25.562] Treatment Diagnosis: Left knee pain   Onset Date: 8/10/2022     Referral Source: [facility]): 2022   Prior Hospitalization: See medical history Provider #: [id number]   Prior Level of Function: walking, gardening, yard work   Comorbidities: HTN, Visual impaired   Medications: Verified on Patient Summary List   The Plan of Care and following information is based on the information from the initial evaluation.   ===========================================================================================  Assessment / jiang information:  Nori Loya is a 62 y.o. female presents with c/o left knee pain since 2022 with no known mechanism of injury. She is tender to palpation over left medial joint line of the tibiofemoral joint. She has reduced bilateral LE strength left more than right. She has reduced single limb balance. Patient ambulates with independence demonstrating an antalgic gait pattern as she has reduced stance time on the left and decreased step length on the right. Patient presentation may be consistent with left knee pain secondary to medial meniscal derangement.  Patient will benefit from skilled PT services to modify and progress therapeutic interventions, address functional mobility deficits, address ROM deficits, address strength deficits, analyze and address soft tissue restrictions, analyze and cue movement patterns, analyze and modify body mechanics/ergonomics and assess and modify postural abnormalities to attain her goals.    ===========================================================================================  Eval Complexity: History LOW Complexity : Zero comorbidities / personal factors that will impact the outcome / POC;  Examination  LOW Complexity : 1-2 Standardized tests and measures addressing body structure, function, activity limitation and / or participation in recreation ; Presentation LOW Complexity : Stable, uncomplicated ;  Decision Making MEDIUM Complexity : FOTO score of 26-74; Overall Complexity LOW   Problem List: pain affecting function, decrease ROM, decrease strength, edema affecting function, impaired gait/ balance, decrease ADL/ functional abilitiies, decrease activity tolerance, decrease flexibility/ joint mobility, and decrease transfer abilities   Treatment Plan may include any combination of the following: Therapeutic exercise, Therapeutic activities, Neuromuscular re-education, Physical agent/modality, Gait/balance training, Manual therapy, Patient education, Self Care training, Functional mobility training, Home safety training, and Stair training  Patient / Family readiness to learn indicated by: asking questions, trying to perform skills and interest  Persons(s) to be included in education: patient (P)  Barriers to Learning/Limitations: no  Measures taken if barriers to learning:   Patient Goal (s): \"Decrease pain and swelling\"   Patient self reported health status: good  Rehabilitation Potential: good  Goals:  Short Term Goals: To be accomplished in 4 weeks:   Patient will report compliance with HEP at least 1x/day to aid in rehabilitation program.   Status at IE: Provided initial HEP   Current:Same as IE     Long Term Goals: To be accomplished in 8 weeks:   Patient will increase strength to 5/5 throughout bilateral LEs to aid in completion of ADLs. Status at IE: 4-/5   Current:Same as IE     Patient will report pain less than 1-2/10 average to aid in completion of ADLs.    Status at IE:5/10   Current:Same as IE     Patient will perform 10 pain free kettle bell squats with 11lbs with good form/technique to aid in completion of ADLs.   Status at [address] with STS   Current:Same as IE    Patient will improve FOTO to 80 points overall to demonstrate improvement in functional ability. Status at IE:66   Current:Same as IE      Frequency / Duration:   Patient to be seen 2  times per week for 8  weeks:  Patient / Caregiver education and instruction: self care and exercises      Therapist Signature: Bobby Link PT, DPT Date: 5/23/0530   Certification Period: NA Time: 2:22 PM   ===========================================================================================  I certify that the above Physical Therapy Services are being furnished while the patient is under my care. I agree with the treatment plan and certify that this therapy is necessary. Physician Signature:        Date:       Time:     Please sign and return to [facility] or you may fax the signed copy to [phone]. Thank you.

## 2022-09-15 ENCOUNTER — HOSPITAL ENCOUNTER (OUTPATIENT)
Dept: PHYSICAL THERAPY | Age: 57
Discharge: HOME | End: 2022-09-15
Payer: OTHER GOVERNMENT

## 2022-09-15 PROCEDURE — 97112 NEUROMUSCULAR REEDUCATION: CPT

## 2022-09-15 PROCEDURE — 97110 THERAPEUTIC EXERCISES: CPT

## 2022-09-15 PROCEDURE — 97530 THERAPEUTIC ACTIVITIES: CPT

## 2022-09-15 NOTE — PROGRESS NOTES
PT DAILY TREATMENT NOTE - [facility]     Patient Name: Carla Dahl  Date:9/15/2022  : 1965  [x]  Patient  Verified  Payor:  / Plan: Melody Johnson / Product Type:  /    In time:1145  Out time:1245  Total Treatment Time (min): 60  Total Timed Codes (min): 60   Visit #: 2 of 15    Treatment Area: Pain in left knee [M25.562]    SUBJECTIVE  Pain Level (0-10 scale): 5  Any medication changes, allergies to medications, adverse drug reactions, diagnosis change, or new procedure performed?: [x] No    [] Yes (see summary sheet for update)  Subjective functional status/changes:   [] No changes reported  Patient reports no new changes since initial visit. OBJECTIVE    30 min Therapeutic Exercise:  [x] See flow sheet :   Rationale: increase ROM, increase strength and decrease pain to improve the patients ability to complete ADLs    20 min Therapeutic Activity:  [x]  See flow sheet :   Rationale: increase ROM, increase strength and improve coordination  to improve the patients ability to complete ADLs     10 min Neuromuscular Re-education:  [x]  See flow sheet :   Rationale: improve coordination, improve balance and increase proprioception  to improve the patients ability to complete ADLs          With   [x] TE   [] TA   [] neuro   [] other: Patient Education: [x] Review HEP    [] Progressed/Changed HEP based on:   [] positioning   [] body mechanics   [] transfers   [] heat/ice application    [] other:      Other Objective/Functional Measures: NA     Pain Level (0-10 scale) post treatment: 3-4    ASSESSMENT/Changes in Function: Patient responds well to treatment session. Patient required cues for activity pacing. She was challenged with STS secondary to reported knee soreness. She was able to perform all therapeutic exercise prescribed. Reviewed HEP to promote good carryover at home.  No adverse effects were noted from today's treatment session    Patient will continue to benefit from skilled PT services to modify and progress therapeutic interventions, address functional mobility deficits, address ROM deficits, address strength deficits, analyze and address soft tissue restrictions, analyze and cue movement patterns, analyze and modify body mechanics/ergonomics, assess and modify postural abnormalities, address imbalance and instruct in home and community integration to attain remaining goals. []  See Plan of Care  []  See progress note/recertification  []  See Discharge Summary         Progress towards goals / Updated goals:  Short Term Goals: To be accomplished in 4 weeks:                 Patient will report compliance with HEP at least 1x/day to aid in rehabilitation program.                 Status at IE: Provided initial HEP                 Current: In-progress, reviewed HEP, 9/15/2022     Long Term Goals: To be accomplished in 8 weeks:                 Patient will increase strength to 5/5 throughout bilateral LEs to aid in completion of ADLs. Status at IE: 4-/5                 Current:Same as IE                    Patient will report pain less than 1-2/10 average to aid in completion of ADLs. Status at IE:5/10                 Current:Same as IE                    Patient will perform 10 pain free kettle bell squats with 11lbs with good form/technique to aid in completion of ADLs. Status at [address] with STS                 Current:Same as IE     Patient will improve FOTO to 80 points overall to demonstrate improvement in functional ability.                  Status at IE:66                 Current:Same as IE      PLAN  []  Upgrade activities as tolerated     [x]  Continue plan of care  []  Update interventions per flow sheet       []  Discharge due to:_  []  Other:_      Yoselin Kumar, PT, DPT 9/15/2022  12:37 PM    Future Appointments   Date Time Provider Marlo Landers   9/20/2022  8:45 AM Huan Chne [facility]   9/22/2022 11:00 AM Farshad Armendariz, PT [facility]   9/26/2022  2:00 PM Farshad Armendariz, PT [facility]   9/29/2022 11:00 AM Jackivan Armendariz, PT [facility]   10/3/2022  1:15 PM Farshad Armendariz, PT [facility]   10/6/2022  8:00 AM Farshad Armendariz, PT [facility]   10/10/2022 11:45 AM Farshad Armendariz, PT [facility]   10/13/2022 10:15 AM Farshad Armendariz, PT [facility]   10/17/2022 10:15 AM Farshad Armendariz, PT [facility]   10/20/2022 10:15 AM Farshad Armendariz, PT [facility] No

## 2022-09-20 ENCOUNTER — HOSPITAL ENCOUNTER (OUTPATIENT)
Dept: PHYSICAL THERAPY | Age: 57
Discharge: HOME | End: 2022-09-20
Payer: OTHER GOVERNMENT

## 2022-09-20 PROCEDURE — 97016 VASOPNEUMATIC DEVICE THERAPY: CPT

## 2022-09-20 PROCEDURE — 97530 THERAPEUTIC ACTIVITIES: CPT

## 2022-09-20 PROCEDURE — 97110 THERAPEUTIC EXERCISES: CPT

## 2022-09-20 PROCEDURE — 97112 NEUROMUSCULAR REEDUCATION: CPT

## 2022-09-20 NOTE — PROGRESS NOTES
PT DAILY TREATMENT NOTE - [facility]     Patient Name: Zoila Lombardo  Date:2022  : 1965  [x]  Patient  Verified  Payor: LAUREN / Plan: Gamaliel Cole 74 / Product Type: [employer] /    In GYI  Out [id number]  Total Treatment Time (min): 58  Total Timed Codes (min): 48   Visit #: 3 of 16    Treatment Area: Pain in left knee [M25.562]    SUBJECTIVE  Pain Level (0-10 scale): 2-3  Any medication changes, allergies to medications, adverse drug reactions, diagnosis change, or new procedure performed?: [x] No    [] Yes (see summary sheet for update)  Subjective functional status/changes:   [] No changes reported    Patient reports that she mowed her lawn with a push mower on Saturday and  resulting in increased pain and edema.      OBJECTIVE  10 min Modalities:         [x]  Vasopneumatic Device: temp 34 deg F         [] low pressure   [x] medium pressure   [] high pressure          - Pre-Vaso Girth (cm):  37.0          - Post-Vaso Girth (cm): 39.0          - Girth Measurement Location: mid-patella                 Rationale: to decrease edema and decrease pain to improve patient's ability to perform ADLs and improve QOL    21 min Therapeutic Exercise:  [x] See flow sheet :   Rationale: increase ROM, increase strength and decrease pain to improve the patients ability to complete ADLs    15 min Therapeutic Activity:  [x]  See flow sheet :   Rationale: increase ROM, increase strength and improve coordination  to improve the patients ability to complete ADLs     12 min Neuromuscular Re-education:  [x]  See flow sheet :   Rationale: improve coordination, improve balance and increase proprioception  to improve the patients ability to complete ADLs          With   [x] TE   [] TA   [] neuro   [] other: Patient Education: [x] Review HEP    [] Progressed/Changed HEP based on:   [] positioning   [] body mechanics   [] transfers   [] heat/ice application    [] other:      Other Objective/Functional Measures: NA     Pain Level (0-10 scale) post treatment: 1-2    ASSESSMENT/Changes in Function: Patient responds well to treatment session. Patient demonstrated improved activity tolerance as she had no complaints with increase in exercise intensity. She required mild UEA during balance activities. Provided close supervision for safety. Introduced vaso-pneumatic device post treatment and she had a reduction in edema. No adverse effects were noted from today's treatment session    Patient will continue to benefit from skilled PT services to modify and progress therapeutic interventions, address functional mobility deficits, address ROM deficits, address strength deficits, analyze and address soft tissue restrictions, analyze and cue movement patterns, analyze and modify body mechanics/ergonomics, assess and modify postural abnormalities, address imbalance and instruct in home and community integration to attain remaining goals. []  See Plan of Care  []  See progress note/recertification  []  See Discharge Summary         Progress towards goals / Updated goals:  Short Term Goals: To be accomplished in 4 weeks:                 Patient will report compliance with HEP at least 1x/day to aid in rehabilitation program.                 Status at IE: Provided initial HEP                 Current: In-progress, reviewed HEP, 9/15/2022     Long Term Goals: To be accomplished in 8 weeks:                 Patient will increase strength to 5/5 throughout bilateral LEs to aid in completion of ADLs. Status at IE: 4-/5                 Current:Same as IE                    Patient will report pain less than 1-2/10 average to aid in completion of ADLs. Status at IE:5/10                 Current: In-progress, 3-4/10, 9/20/2022                    Patient will perform 10 pain free kettle bell squats with 11lbs with good form/technique to aid in completion of ADLs.                  Status at [address] with STS                 Current:Same as IE     Patient will improve FOTO to 80 points overall to demonstrate improvement in functional ability.                  Status at IE:66                 Current:Same as IE    PLAN  []  Upgrade activities as tolerated     [x]  Continue plan of care  []  Update interventions per flow sheet       []  Discharge due to:_  []  Other:_      Cristiane Trinh PT, DPT 9/20/2022  8:46 AM    Future Appointments   Date Time Provider Marlo Landers   9/22/2022 11:00 AM William Promise, PT [facility]   9/26/2022  2:00 PM William Promise, PT [facility]   9/29/2022 11:00 AM William Promise, PT [facility]   10/3/2022  1:15 PM William Promise, PT [facility]   10/6/2022  8:00 AM William Promise, PT [facility]   10/10/2022 11:45 AM William Promise, PT [facility]   10/13/2022 10:15 AM William Promise, PT [facility]   10/17/2022 10:15 AM William Promise, PT [facility]   10/20/2022 10:15 AM William Promise, PT [facility]

## 2022-09-22 ENCOUNTER — HOSPITAL ENCOUNTER (OUTPATIENT)
Dept: PHYSICAL THERAPY | Age: 57
Discharge: HOME | End: 2022-09-22
Payer: OTHER GOVERNMENT

## 2022-09-22 PROCEDURE — 97112 NEUROMUSCULAR REEDUCATION: CPT

## 2022-09-22 PROCEDURE — 97110 THERAPEUTIC EXERCISES: CPT

## 2022-09-22 PROCEDURE — 97530 THERAPEUTIC ACTIVITIES: CPT

## 2022-09-22 NOTE — PROGRESS NOTES
PT DAILY TREATMENT NOTE - [facility]     Patient Name: Eather Cranker  Date:2022  : 1965  [x]  Patient  Verified  Payor: LAUREN / Plan: Gamaliel Cole 74 / Product Type:  /    In time:1100  Out time:1145  Total Treatment Time (min): 45  Total Timed Codes (min): 45   Visit #: 4 of 16    Treatment Area: Pain in left knee [M25.562]    SUBJECTIVE  Pain Level (0-10 scale): 2  Any medication changes, allergies to medications, adverse drug reactions, diagnosis change, or new procedure performed?: [x] No    [] Yes (see summary sheet for update)  Subjective functional status/changes:   [] No changes reported    Patient reports that she had muscle soreness after last visit. OBJECTIVE    25 min Therapeutic Exercise:  [x] See flow sheet :   Rationale: increase ROM, increase strength and decrease pain to improve the patients ability to complete ADLs    10 min Therapeutic Activity:  [x]  See flow sheet :   Rationale: increase ROM, increase strength and improve coordination  to improve the patients ability to complete ADLs     10 min Neuromuscular Re-education:  [x]  See flow sheet :   Rationale: improve coordination, improve balance and increase proprioception  to improve the patients ability to complete ADLs          With   [x] TE   [] TA   [] neuro   [] other: Patient Education: [x] Review HEP    [] Progressed/Changed HEP based on:   [] positioning   [] body mechanics   [] transfers   [] heat/ice application    [] other:      Other Objective/Functional Measures: NA     Pain Level (0-10 scale) post treatment: 2    ASSESSMENT/Changes in Function: Patient responds well to treatment session. Patient required cues for activity pacing. Advanced exercise by introducing single leg bridge on swiss ball and patient demonstrated good activity tolerance. Provided close supervision during balance activities. Will continue to advance exercise as tolerated.  No adverse effects were noted from today's treatment session    Patient will continue to benefit from skilled PT services to modify and progress therapeutic interventions, address functional mobility deficits, address ROM deficits, address strength deficits, analyze and address soft tissue restrictions, analyze and cue movement patterns, analyze and modify body mechanics/ergonomics, assess and modify postural abnormalities, address imbalance and instruct in home and community integration to attain remaining goals. []  See Plan of Care  []  See progress note/recertification  []  See Discharge Summary         Progress towards goals / Updated goals:  Short Term Goals: To be accomplished in 4 weeks:                 Patient will report compliance with HEP at least 1x/day to aid in rehabilitation program.                 Status at IE: Provided initial HEP                 Current: In-progress, reviewed HEP, 9/15/2022     Long Term Goals: To be accomplished in 8 weeks:                 Patient will increase strength to 5/5 throughout bilateral LEs to aid in completion of ADLs. Status at IE: 4-/5                 Current:Same as IE                    Patient will report pain less than 1-2/10 average to aid in completion of ADLs. Status at IE:5/10                 Current: In-progress, 3-4/10, 9/20/2022                    Patient will perform 10 pain free kettle bell squats with 11lbs with good form/technique to aid in completion of ADLs. Status at [address] with STS                 Current:Same as IE     Patient will improve FOTO to 80 points overall to demonstrate improvement in functional ability.                  Status at IE:66                 Current:Same as IE    PLAN  []  Upgrade activities as tolerated     [x]  Continue plan of care  []  Update interventions per flow sheet       []  Discharge due to:_  []  Other:_      Bernard Barroso, PT, DPT 9/22/2022  11:05 AM    Future Appointments   Date Time Provider Department [place]   9/26/2022  2:00 PM Isabel Surya, PT [facility]   9/29/2022 11:00 AM Josh Surya, PT [facility]   10/3/2022  1:15 PM Josh Surya, PT [facility]   10/6/2022  8:00 AM Josh Surya, PT [facility]   10/10/2022 11:45 AM Isabel Surya, PT [facility]   10/13/2022 10:15 AM Josh Surya, PT [facility]   10/17/2022 10:15 AM Isabel Surya, PT [facility]   10/20/2022 10:15 AM Josh Surya, PT [facility]

## 2022-09-26 ENCOUNTER — HOSPITAL ENCOUNTER (OUTPATIENT)
Dept: PHYSICAL THERAPY | Age: 57
Discharge: HOME | End: 2022-09-26
Payer: OTHER GOVERNMENT

## 2022-09-26 PROCEDURE — 97530 THERAPEUTIC ACTIVITIES: CPT

## 2022-09-26 PROCEDURE — 97016 VASOPNEUMATIC DEVICE THERAPY: CPT

## 2022-09-26 PROCEDURE — 97110 THERAPEUTIC EXERCISES: CPT

## 2022-09-26 NOTE — PROGRESS NOTES
PT DAILY TREATMENT NOTE     Patient Name: Reshma Gloria  Date:2022  : 1965  [x]  Patient  Verified  Payor: LAUREN / Plan: Gamaliel Cole 74 / Product Type:  /    In time:1400  Out time:1430  Total Treatment Time (min): 30  Total Timed Codes (min): 20   Visit #: 5 of 16    Treatment Area: Pain in left knee [M25.562]    SUBJECTIVE  Pain Level (0-10 scale): 6  Any medication changes, allergies to medications, adverse drug reactions, diagnosis change, or new procedure performed?: [x] No    [] Yes (see summary sheet for update)  Subjective functional status/changes:   [] No changes reported  Patient reports that she performed a lot of yard work and her knee is very sore today. OBJECTIVE  10 min Modalities:         [x]  Vasopneumatic Device: temp 34 deg F         [x] low pressure   [] medium pressure   [] high pressure          - Pre-Vaso Girth (cm):  39.0          - Post-Vaso Girth (cm): 37.2          - Girth Measurement Location: mid-patella                 Rationale: to decrease edema and decrease pain to improve patient's ability to perform ADLs and improve QOL    20 min Therapeutic Exercise:  [x] See flow sheet :   Rationale: increase ROM, increase strength and decrease pain to improve the patients ability to complete ADLs     With   [x] TE   [] TA   [] neuro   [] other: Patient Education: [x] Review HEP    [] Progressed/Changed HEP based on:   [] positioning   [] body mechanics   [] transfers   [] heat/ice application    [] other:      Other Objective/Functional Measures: NA     Pain Level (0-10 scale) post treatment: 5    ASSESSMENT/Changes in Function: Patient responds fairly to treatment session as she had increased knee pain prior to treatment. Reduced exercise intensity due to increased knee pain. Assessed knee and found increased edema upon left knee. Applied vaso pneumatic device and she had a reduction in symptoms.   No adverse effects were noted from today's treatment session      Patient will continue to benefit from skilled PT services to modify and progress therapeutic interventions, address functional mobility deficits, address ROM deficits, address strength deficits, analyze and address soft tissue restrictions, analyze and cue movement patterns, analyze and modify body mechanics/ergonomics, assess and modify postural abnormalities, address imbalance and instruct in home and community integration to attain remaining goals. []  See Plan of Care  []  See progress note/recertification  []  See Discharge Summary         Progress towards goals / Updated goals:  Short Term Goals: To be accomplished in 4 weeks:                 Patient will report compliance with HEP at least 1x/day to aid in rehabilitation program.                 Status at IE: Provided initial HEP                 Current: Met, 9/26/2022     Long Term Goals: To be accomplished in 8 weeks:                 Patient will increase strength to 5/5 throughout bilateral LEs to aid in completion of ADLs. Status at IE: 4-/5                 Current:Same as IE                    Patient will report pain less than 1-2/10 average to aid in completion of ADLs. Status at IE:5/10                 Current: In-progress, 3-4/10, 9/20/2022                    Patient will perform 10 pain free kettle bell squats with 11lbs with good form/technique to aid in completion of ADLs. Status at [address] with STS                 Current:Same as IE     Patient will improve FOTO to 80 points overall to demonstrate improvement in functional ability.                  Status at IE:66                 Current:Same as IE    PLAN  []  Upgrade activities as tolerated     [x]  Continue plan of care  []  Update interventions per flow sheet       []  Discharge due to:_  []  Other:_      Nikki Ahumada, PT, DPT 9/26/2022  2:13 PM    Future Appointments   Date Time Provider Marlo Landers   9/29/2022 11:00 AM Dayday Martinez, PT [facility]   10/3/2022  1:15 PM Dayday Martinez, PT [facility]   10/6/2022  8:00 AM Dayday Martinez, PT [facility]   10/10/2022 11:45 AM Dayday Martinez, PT [facility]   10/13/2022 10:15 AM Dayday Martinez, PT [facility]   10/17/2022 10:15 AM Dayday Martinez, PT [facility]   10/20/2022 10:15 AM Dayday Martinez, PT [facility]

## 2022-09-29 ENCOUNTER — APPOINTMENT (OUTPATIENT)
Dept: PHYSICAL THERAPY | Age: 57
End: 2022-09-29
Payer: OTHER GOVERNMENT

## 2022-10-03 ENCOUNTER — HOSPITAL ENCOUNTER (OUTPATIENT)
Dept: PHYSICAL THERAPY | Age: 57
Discharge: HOME | End: 2022-10-03
Payer: OTHER GOVERNMENT

## 2022-10-03 PROCEDURE — 97112 NEUROMUSCULAR REEDUCATION: CPT

## 2022-10-03 PROCEDURE — 97530 THERAPEUTIC ACTIVITIES: CPT

## 2022-10-03 PROCEDURE — 97016 VASOPNEUMATIC DEVICE THERAPY: CPT

## 2022-10-03 PROCEDURE — 97110 THERAPEUTIC EXERCISES: CPT

## 2022-10-03 NOTE — PROGRESS NOTES
PT DAILY TREATMENT NOTE    Patient Name: Rustam Ocasio  Date:10/3/2022  : 1965  [x]  Patient  Verified  Payor:  / Plan: Gamaliel Cole 74 / Product Type:  /    In time:112  Out time:203  Total Treatment Time (min): 51  Total Timed Codes (min): 41  1:1 Treatment Time ([facility] only): 41   Visit #: 6 of 15    Treatment Dx: Pain in left knee [M25.562]    SUBJECTIVE  Pain Level (0-10 scale): 2  Any medication changes, allergies to medications, adverse drug reactions, diagnosis change, or new procedure performed?: [x] No    [] Yes (see summary sheet for update)  Subjective functional status/changes:   [] No changes reported  Patient reports of reduced left knee pain since last visit. However, she stated that she continues to have difficulty with bending left knee due to swelling.     OBJECTIVE    Modalities Rationale:     decrease edema, decrease inflammation, and decrease pain to improve patient's ability to return PLOF   min [] Estim, type/location:                                      []  att     []  unatt     []  w/US     []  w/ice    []  w/heat    min []  Mechanical Traction: type/lbs                   []  pro   []  sup   []  int   []  cont    []  before manual    []  after manual    min []  Ultrasound, settings/location:      min []  Iontophoresis w/ dexamethasone, location:                                               []  take home patch       []  in clinic    min []  Ice     []  Heat    location/position:    10 min [x]  Vasopneumatic Device, press/temp: Left knee/ low/ 34 degrees   If using vaso (only need to measure limb vaso being performed on)      pre-treatment girth : 37 cm      post-treatment girth : 36 cm      measured at (landmark location) :  mid-patella    min []  Other:    [x] Skin assessment post-treatment (if applicable):    [x]  intact    []  redness- no adverse reaction                  []redness - adverse reaction:        20 min Therapeutic Exercise:  [x] See flow sheet :   Rationale: increase ROM, increase strength and decrease pain to improve the patients ability to complete ADLs     11 min Therapeutic Activity:  [x]  See flow sheet :   Rationale: increase ROM, increase strength and improve coordination  to improve the patients ability to complete ADLs     10 min Neuromuscular Re-education:  [x]  See flow sheet :   Rationale: improve coordination, improve balance and increase proprioception  to improve the patients ability to complete ADLs          With   [] TE   [] TA   [] neuro   [] other: Patient Education: [x] Review HEP    [] Progressed/Changed HEP based on:   [] positioning   [] body mechanics   [] transfers   [] heat/ice application    [] other:      Other Objective/Functional Measures: NA  Bend knee hurts      Pain Level (0-10 scale) post treatment: 2    ASSESSMENT/Changes in Function:   Patient tolerated treatment session well today. Patient had no complaints with addition of SLR and knee flexion to exercise program to accomplish mobility and improve quadriceps strength. Patient was demonstrated STS with 4 lbs weight today. She demonstrated good tolerance with added weights. Patient continues to make steady progress toward goals and would benefit from continued skilled PT intervention to address remaining deficits outlined in goals below. Patient will continue to benefit from skilled PT services to modify and progress therapeutic interventions, address functional mobility deficits, address ROM deficits, address strength deficits, analyze and address soft tissue restrictions, analyze and cue movement patterns, analyze and modify body mechanics/ergonomics, and assess and modify postural abnormalities to attain remaining goals. [x]  See Plan of Care  []  See progress note/recertification  []  See Discharge Summary         Progress towards goals / Updated goals:  Short Term Goals:  To be accomplished in 4 weeks:                 Patient will report compliance with HEP at least 1x/day to aid in rehabilitation program.                 Status at IE: Provided initial HEP                 Current: Met, 9/26/2022     Long Term Goals: To be accomplished in 8 weeks:                 Patient will increase strength to 5/5 throughout bilateral LEs to aid in completion of ADLs. Status at IE: 4-/5                 Current:Same as IE                    Patient will report pain less than 1-2/10 average to aid in completion of ADLs. Status at IE:5/10                 Current: In-progress, 3-4/10, 9/20/2022                    Patient will perform 10 pain free kettle bell squats with 11lbs with good form/technique to aid in completion of ADLs. Status at [address] with STS                 Current 10/3/22: patient demonstrated 2x10 STS with 4 lbs. IN PROGRESS     Patient will improve FOTO to 80 points overall to demonstrate improvement in functional ability.                  Status at IE:66                 Current:Same as IE       PLAN  []  Upgrade activities as tolerated     [x]  Continue plan of care  []  Update interventions per flow sheet       []  Discharge due to:_  []  Other:_      Charlie Ibarra, SALEEM 10/3/2022  1:15 PM    Future Appointments   Date Time Provider Marlo Landers   10/6/2022  8:00 AM [facility]   10/10/2022 11:45 AM Shoaib Land PT [facility]   10/13/2022 10:15 AM David Fowler PT [facility]   10/17/2022 10:15 AM Shoaib Land PT [facility]   10/20/2022 10:15 AM Shoaib Land PT [facility]

## 2022-10-06 ENCOUNTER — HOSPITAL ENCOUNTER (OUTPATIENT)
Dept: PHYSICAL THERAPY | Age: 57
Discharge: HOME | End: 2022-10-06
Payer: OTHER GOVERNMENT

## 2022-10-06 PROCEDURE — 97110 THERAPEUTIC EXERCISES: CPT

## 2022-10-06 PROCEDURE — 97112 NEUROMUSCULAR REEDUCATION: CPT

## 2022-10-06 PROCEDURE — 97530 THERAPEUTIC ACTIVITIES: CPT

## 2022-10-06 PROCEDURE — 97016 VASOPNEUMATIC DEVICE THERAPY: CPT

## 2022-10-06 NOTE — PROGRESS NOTES
[facility] at [address] [place] [address] Drive: [phone]   Fax: [phone]  Progress Note  Patient Name: Lam Fried : 1965   Medical   Diagnosis: Pain in left knee [M25.562] Treatment Diagnosis: Left knee pain   Onset Date: 8/10/2022     Referral Source: [address] [facility]): 2022   Prior Hospitalization: See medical history Provider #: [id number]   Prior Level of Function: walking, gardening, yard work   Comorbidities: HTN, Visual impaired   Medications: Verified on Patient Summary List      ===========================================================================================  Assessment / Summary of Care:  Lam Fried is a 62 y.o. female with left knee pain. Patient is improving as she has met 1/1 STGs and 1/4 LTGs and is progressing toward her remaining goals. She is developing strength and functional mobility resulting in increased activity tolerance. She is compliant with HEP and will begin transition toward independent exercise over the next two weeks as patient progresses toward her remaining LTGs. Patient will continue to benefit from skilled PT services to modify and progress therapeutic interventions, address functional mobility deficits, address ROM deficits, address strength deficits, analyze and address soft tissue restrictions, analyze and cue movement patterns, analyze and modify body mechanics/ergonomics, assess and modify postural abnormalities, address imbalance and instruct in home and community integration to attain remaining goals.     ===========================================================================================    Plan:Continue therapy per initial plan/protocol at a frequency of  2 x per week for 3 weeks    Goals:   Short Term Goals:  To be accomplished in 4 weeks:                 Patient will report compliance with HEP at least 1x/day to aid in rehabilitation program.                 Status at IE: Provided initial HEP                 Current: Met, 9/26/2022     Long Term Goals: To be accomplished in 8 weeks:                 Patient will increase strength to 5/5 throughout bilateral LEs to aid in completion of ADLs. Status at IE: 4-/5                 Current: In-progress, 4+/5, 10/6/2022                    Patient will report pain less than 1-2/10 average to aid in completion of ADLs. Status at IE:5/10                 Current: In-progress, 2-4/10, 10/6/2022                    Patient will perform 10 pain free kettle bell squats with 11lbs with good form/technique to aid in completion of ADLs. Status at [address] with STS                 Current: In-progress, 2 x 10 STS with 7#, 10/6/2022     Patient will improve FOTO to 80 points overall to demonstrate improvement in functional ability. Status at IE:66                 Current: Met, 84, 10/6/2022    ===========================================================================================  Subjective: Patient reports that she is feeling much better than last week.       Objective:   FOTO 84  MMT 4+/5          2 x 10 STS with 7# with 1/10 pain    Therapist Signature: Kelly Weldon PT, DPT Date: 40/8/2690   Re-Certification: NA Time: 9:21 AM         [facility] at [address]  Phone: [phone]   Fax: [phone]

## 2022-10-06 NOTE — PROGRESS NOTES
PT DAILY TREATMENT NOTE - [facility]     Patient Name: Rory Murillo  Date:10/6/2022  : 1965  [x]  Patient  Verified  Payor: LAUREN / Plan: Gamaliel Cole 74 / Product Type:  /    In ENF  Out time:09  Total Treatment Time (min): 65  Total Timed Codes (min): 55   Visit #: 6 of 16    Treatment Area: Pain in left knee [M25.562]    SUBJECTIVE  Pain Level (0-10 scale): 2  Any medication changes, allergies to medications, adverse drug reactions, diagnosis change, or new procedure performed?: [x] No    [] Yes (see summary sheet for update)  Subjective functional status/changes:   [] No changes reported    Patient reports that she is feeling much better than last week.      OBJECTIVE  10 min Modalities: left knee         [x]  Vasopneumatic Device: temp 34 deg F         [x] low pressure   [] medium pressure   [] high pressure          - Pre-Vaso Girth (cm):  37.7          - Post-Vaso Girth (cm): 36.8          - Girth Measurement Location: mid-patella                 Rationale: to decrease edema and decrease pain to improve patient's ability to perform ADLs and improve QOL    20 min Therapeutic Exercise:  [x] See flow sheet :   Rationale: increase ROM, increase strength and decrease pain to improve the patients ability to complete ADLs    20 min Therapeutic Activity:  [x]  See flow sheet :   Rationale: increase ROM, increase strength and improve coordination  to improve the patients ability to complete ADLs     15 min Neuromuscular Re-education:  [x]  See flow sheet :   Rationale: improve coordination, improve balance and increase proprioception  to improve the patients ability to complete ADLs          With   [x] TE   [] TA   [] neuro   [] other: Patient Education: [x] Review HEP    [] Progressed/Changed HEP based on:   [] positioning   [] body mechanics   [] transfers   [] heat/ice application    [] other:      Other Objective/Functional Measures:   FOTO 84  MMT 4+/5   2 x 10 STS with 7# with 1/10 pain    Pain Level (0-10 scale) post treatment: 1    ASSESSMENT/Changes in Function:     Patient responds well to treatment session. No adverse effects were noted from today's treatment session. Patient is improving as she has met 1/1 STGs and 1/4 LTGs and is progressing toward her remaining goals. She is developing strength and functional mobility resulting in increased activity tolerance. She is compliant with HEP and will begin transition toward independent exercise over the next two weeks as patient progresses toward her remaining LTGs. Patient will continue to benefit from skilled PT services to modify and progress therapeutic interventions, address functional mobility deficits, address ROM deficits, address strength deficits, analyze and address soft tissue restrictions, analyze and cue movement patterns, analyze and modify body mechanics/ergonomics, assess and modify postural abnormalities, address imbalance and instruct in home and community integration to attain remaining goals. []  See Plan of Care  [x]  See progress note  []  See Discharge Summary         Progress towards goals / Updated goals:  Short Term Goals: To be accomplished in 4 weeks:                 Patient will report compliance with HEP at least 1x/day to aid in rehabilitation program.                 Status at IE: Provided initial HEP                 Current: Met, 9/26/2022     Long Term Goals: To be accomplished in 8 weeks:                 Patient will increase strength to 5/5 throughout bilateral LEs to aid in completion of ADLs. Status at IE: 4-/5                 Current: In-progress, 4+/5, 10/6/2022                    Patient will report pain less than 1-2/10 average to aid in completion of ADLs. Status at IE:5/10                 Current:   In-progress, 2-4/10, 10/6/2022                    Patient will perform 10 pain free kettle bell squats with 11lbs with good form/technique to aid in completion of ADLs. Status at [address] with STS                 Current: In-progress, 2 x 10 STS with 7#, 10/6/2022     Patient will improve FOTO to 80 points overall to demonstrate improvement in functional ability.                  Status at IE:66                 Current: Met, 84, 10/6/2022     PLAN  []  Upgrade activities as tolerated     [x]  Continue plan of care  []  Update interventions per flow sheet       []  Discharge due to:_  []  Other:_      Mortimer Sickle, PT, DPT 10/6/2022  8:04 AM    Future Appointments   Date Time Provider Marlo Landers   10/10/2022 11:45 AM [facility]I [facility]   10/13/2022 10:15 AM [facility]I Garcia[facility]   10/17/2022 10:15 AM Branden Patten PT [facility]   10/20/2022 10:15 AM Branden Patten PT [facility]

## 2022-10-10 ENCOUNTER — HOSPITAL ENCOUNTER (OUTPATIENT)
Dept: PHYSICAL THERAPY | Age: 57
Discharge: HOME | End: 2022-10-10
Payer: OTHER GOVERNMENT

## 2022-10-10 PROCEDURE — 97016 VASOPNEUMATIC DEVICE THERAPY: CPT

## 2022-10-10 PROCEDURE — 97530 THERAPEUTIC ACTIVITIES: CPT

## 2022-10-10 PROCEDURE — 97110 THERAPEUTIC EXERCISES: CPT

## 2022-10-10 PROCEDURE — 97112 NEUROMUSCULAR REEDUCATION: CPT

## 2022-10-10 NOTE — PROGRESS NOTES
PT DAILY TREATMENT NOTE    Patient Name: Ryann Sam  Date:10/10/2022  : 1965  [x]  Patient  Verified  Payor: LAUREN / Plan: Gamaliel Cole 74 / Product Type: [place] /    In time:1147  Out time:1240  Total Treatment Time (min): 53  Total Timed Codes (min): 43  Visit #: 7 of 15    Treatment Dx: Pain in left knee [M25.562]    SUBJECTIVE  Pain Level (0-10 scale): 1-2  Any medication changes, allergies to medications, adverse drug reactions, diagnosis change, or new procedure performed?: [x] No    [] Yes (see summary sheet for update)  Subjective functional status/changes:   [] No changes reported  Patient reports of slight pain with left knee. She stated that she has been improving with walking distance with reduced pain and soreness.      OBJECTIVE       Modalities Rationale:     decrease edema, decrease inflammation, and decrease pain to improve patient's ability to return PLOF                min [] Estim, type/location:                                                            []  att     []  unatt     []  w/US     []  w/ice    []  w/heat     min []  Mechanical Traction: type/lbs                    []  pro   []  sup   []  int   []  cont    []  before manual    []  after manual     min []  Ultrasound, settings/location:        min []  Iontophoresis w/ dexamethasone, location:                                                []  take home patch       []  in clinic     min []  Ice     []  Heat    location/position:     10 min [x]  Vasopneumatic Device, press/temp: Left knee/ low/ 34 degrees   If using vaso (only need to measure limb vaso being performed on)      pre-treatment girth : 37 cm      post-treatment girth : 36 cm      measured at (landmark location) :  mid-patella     min []  Other:     [x] Skin assessment post-treatment (if applicable):    [x]  intact    []  redness- no adverse reaction                  []redness - adverse reaction:         20 min Therapeutic Exercise:  [x] See flow sheet :   Rationale: increase ROM, increase strength and decrease pain to improve the patients ability to complete ADLs     13 min Therapeutic Activity:  [x]  See flow sheet :   Rationale: increase ROM, increase strength and improve coordination  to improve the patients ability to complete ADLs     10 min Neuromuscular Re-education:  [x]  See flow sheet :   Rationale: improve coordination, improve balance and increase proprioception  to improve the patients ability to complete ADLs          With   [] TE   [] TA   [] neuro   [] other: Patient Education: [x] Review HEP    [] Progressed/Changed HEP based on:   [] positioning   [] body mechanics   [] transfers   [] heat/ice application    [] other:      Other Objective/Functional Measures: NA     Pain Level (0-10 scale) post treatment: 0    ASSESSMENT/Changes in Function: Patient tolerated treatment session well today. Patient continues to have left knee pain when walking up hill. Added quad set and SLR to improve knee extension and strengthening. Patient continues to make steady progress toward goals and would benefit from continued skilled PT intervention to address remaining deficits outlined in goals below. Patient will continue to benefit from skilled PT services to modify and progress therapeutic interventions, address functional mobility deficits, address ROM deficits, address strength deficits, analyze and address soft tissue restrictions, analyze and cue movement patterns, analyze and modify body mechanics/ergonomics, and assess and modify postural abnormalities to attain remaining goals. [x]  See Plan of Care  []  See progress note/recertification  []  See Discharge Summary         Progress towards goals / Updated goals:  Short Term Goals:  To be accomplished in 4 weeks:                 Patient will report compliance with HEP at least 1x/day to aid in rehabilitation program.                 Status at IE: Provided initial HEP                 Current: Met, 9/26/2022     Long Term Goals: To be accomplished in 8 weeks:                 Patient will increase strength to 5/5 throughout bilateral LEs to aid in completion of ADLs. Status at IE: 4-/5                 Current: In-progress, 4+/5, 10/6/2022                    Patient will report pain less than 1-2/10 average to aid in completion of ADLs. Status at IE:5/10                 Current 10/10/22: 5/10 pain at worst. IN PROGRESS                    Patient will perform 10 pain free kettle bell squats with 11lbs with good form/technique to aid in completion of ADLs. Status at [address] with STS                 Current: In-progress, 2 x 10 STS with 7#, 10/6/2022     Patient will improve FOTO to 80 points overall to demonstrate improvement in functional ability.                  Status at IE:66                 Current: Met, 84, 10/6/2022    PLAN  []  Upgrade activities as tolerated     [x]  Continue plan of care  []  Update interventions per flow sheet       []  Discharge due to:_  []  Other:_      Isaias Soto PTA 10/10/2022  11:52 AM    Future Appointments   Date Time Provider Marlo Landers   10/13/2022 10:15 AM KALLI Leroy [facility]   10/17/2022 10:15 AM KALLI Vila [facility]   10/20/2022 10:15 AM KALLI Vila [facility]

## 2022-10-13 ENCOUNTER — HOSPITAL ENCOUNTER (OUTPATIENT)
Dept: PHYSICAL THERAPY | Age: 57
Discharge: HOME | End: 2022-10-13
Payer: OTHER GOVERNMENT

## 2022-10-13 PROCEDURE — 97530 THERAPEUTIC ACTIVITIES: CPT

## 2022-10-13 PROCEDURE — 97110 THERAPEUTIC EXERCISES: CPT

## 2022-10-13 PROCEDURE — 97112 NEUROMUSCULAR REEDUCATION: CPT

## 2022-10-13 NOTE — PROGRESS NOTES
PT DAILY TREATMENT NOTE    Patient Name: Rebecca Preston  Date:10/13/2022  : 1965  [x]  Patient  Verified  Payor: LAUREN / Plan: Gamaliel Cole 74 / Product Type:  /    In time: 1013  Out time: 1057  Total Treatment Time (min): 44  Total Timed Codes (min): 44  1:1 Treatment Time (MC only): 40   Visit #: 9 of 15    Treatment Dx: Pain in left knee [M25.562]    SUBJECTIVE  Pain Level (0-10 scale): 1-2  Any medication changes, allergies to medications, adverse drug reactions, diagnosis change, or new procedure performed?: [x] No    [] Yes (see summary sheet for update)  Subjective functional status/changes:   [] No changes reported  \"The knee has been noticeable better recently. It doesn't seem to be swelling as much. \"    OBJECTIVE    15 min Therapeutic Exercise:  [x] See flow sheet :   Rationale: increase ROM, increase strength and decrease pain to improve the patients ability to complete ADLs  ambulation safety and efficiency in order to improve patient's ability to safely ambulate at home for self care. 15 min Therapeutic Activity:  [x]  See flow sheet :   Rationale: increase ROM, increase strength and improve coordination  to improve the patients ability to Complete ADLS     14 min Neuromuscular Re-education:  [x]  See flow sheet :   Rationale: improve coordination, improve balance and increase proprioception  to improve the patients ability to complete ADLS, and decrease falls risk    With   [] TE   [] TA   [] neuro   [] other: Patient Education: [x] Review HEP    [] Progressed/Changed HEP based on:   [] positioning   [] body mechanics   [] transfers   [] heat/ice application    [] other:      Other Objective/Functional Measures: NA     Pain Level (0-10 scale) post treatment: 1-2    ASSESSMENT/Changes in Function: Patient noting gradually improving activity tolerance and diminishing joint effusion. Patient responds well to treatment session.   No adverse effects were noted from today's treatment session. Patient will continue to benefit from skilled PT services to modify and progress therapeutic interventions, address functional mobility deficits, address ROM deficits, address strength deficits, analyze and address soft tissue restrictions, analyze and cue movement patterns, analyze and modify body mechanics/ergonomics, assess and modify postural abnormalities,  and instruct in home and community integration to attain remaining goals. [x]  See Plan of Care  []  See progress note/recertification  []  See Discharge Summary         Progress towards goals / Updated goals:  Short Term Goals: To be accomplished in 4 weeks:                 Patient will report compliance with HEP at least 1x/day to aid in rehabilitation program.                 Status at IE: Provided initial HEP                 Current: Met, 9/26/2022     Long Term Goals: To be accomplished in 8 weeks:                 Patient will increase strength to 5/5 throughout bilateral LEs to aid in completion of ADLs. Status at IE: 4-/5                 Current: In-progress, 4+/5, 10/6/2022                    Patient will report pain less than 1-2/10 average to aid in completion of ADLs. Status at IE:5/10                 Current 10/10/22: 5/10 pain at worst. IN PROGRESS                    Patient will perform 10 pain free kettle bell squats with 11lbs with good form/technique to aid in completion of ADLs. Status at [address] with STS                 Current: In-progress, 2 x 10 STS with 7#, 10/6/2022     Patient will improve FOTO to 80 points overall to demonstrate improvement in functional ability.                  Status at IE:66                 Current: Met, 84, 10/6/2022       PLAN  []  Upgrade activities as tolerated     [x]  Continue plan of care  []  Update interventions per flow sheet       []  Discharge due to:_  []  Other:_      Mica Sheldon PT 10/13/2022  10:29 AM    Future Appointments   Date Time Provider Marlo Landers   10/17/2022 10:15 AM KALLI Jeffery [facility]   10/20/2022 10:15 AM KALLI Jeffery [facility]

## 2022-10-17 ENCOUNTER — HOSPITAL ENCOUNTER (OUTPATIENT)
Dept: PHYSICAL THERAPY | Age: 57
Discharge: HOME | End: 2022-10-17
Payer: OTHER GOVERNMENT

## 2022-10-17 PROCEDURE — 97110 THERAPEUTIC EXERCISES: CPT

## 2022-10-17 PROCEDURE — 97112 NEUROMUSCULAR REEDUCATION: CPT

## 2022-10-17 PROCEDURE — 97530 THERAPEUTIC ACTIVITIES: CPT

## 2022-10-17 NOTE — PROGRESS NOTES
PT DAILY TREATMENT NOTE -  Patient Name: Amy Duong  Date:10/17/2022  : 1965  [x]  Patient  Verified  Payor: LAUREN / Plan: Gamaliel Cole 74 / Product Type: Janice Failing /    In time:1010  Out time:1104  Total Treatment Time (min): 54  Total Timed Codes (min): 47   Visit #: 10 of 15    Treatment Area: Pain in left knee [M25.562]    SUBJECTIVE  Pain Level (0-10 scale): 3  Any medication changes, allergies to medications, adverse drug reactions, diagnosis change, or new procedure performed?: [x] No    [] Yes (see summary sheet for update)  Subjective functional status/changes:   [] No changes reported    Patient reports that she has knee soreness due pushing a heavy object with her  over the weekend. OBJECTIVE         30 min Therapeutic Exercise:  [x] See flow sheet :   Rationale: increase ROM, increase strength and decrease pain to improve the patients ability to complete ADLs    10 min Therapeutic Activity:  [x]  See flow sheet :   Rationale: increase ROM, increase strength and improve coordination  to improve the patients ability to complete ADLs     14 min Neuromuscular Re-education:  [x]  See flow sheet :   Rationale: improve coordination, improve balance and increase proprioception  to improve the patients ability to complete ADLs        With   [x] TE   [] TA   [] neuro   [] other: Patient Education: [x] Review HEP    [] Progressed/Changed HEP based on:   [] positioning   [] body mechanics   [] transfers   [] heat/ice application    [] other:      Other Objective/Functional Measures: NA     Pain Level (0-10 scale) post treatment: 3    ASSESSMENT/Changes in Function: Patient responds well to treatment session. Patient had knee soreness prior to treatment but the pain did not limited her ability to perform exercise. Will reassess next visit for potential discharge.  No adverse effects were noted from today's treatment session    Patient will continue to benefit from skilled PT services to modify and progress therapeutic interventions, address functional mobility deficits, address ROM deficits, address strength deficits, analyze and address soft tissue restrictions, analyze and cue movement patterns, analyze and modify body mechanics/ergonomics, assess and modify postural abnormalities, address imbalance and instruct in home and community integration to attain remaining goals. []  See Plan of Care  []  See progress note/recertification  []  See Discharge Summary         Progress towards goals / Updated goals:  Progress towards goals / Updated goals:  Short Term Goals: To be accomplished in 4 weeks:                 Patient will report compliance with HEP at least 1x/day to aid in rehabilitation program.                 Status at IE: Provided initial HEP                 Current: Met, 9/26/2022     Long Term Goals: To be accomplished in 8 weeks:                 Patient will increase strength to 5/5 throughout bilateral LEs to aid in completion of ADLs. Status at IE: 4-/5                 Current: In-progress, 4+/5, 10/6/2022                    Patient will report pain less than 1-2/10 average to aid in completion of ADLs. Status at IE:5/10                 Current 10/17/22: 3/10 pain at worst. IN PROGRESS                    Patient will perform 10 pain free kettle bell squats with 11lbs with good form/technique to aid in completion of ADLs. Status at [address] with STS                 Current: In-progress, 2 x 10 STS with 7#, 10/6/2022     Patient will improve FOTO to 80 points overall to demonstrate improvement in functional ability.                  Status at IE:66                 Current: Met, 84, 10/6/2022    PLAN  []  Upgrade activities as tolerated     [x]  Continue plan of care  []  Update interventions per flow sheet       []  Discharge due to:_  []  Other:_      Bennie Vernon, PT, DPT 10/17/2022  10:25 AM    Future Appointments   Date Time Provider Marlo Landers   10/20/2022 10:15 AM Kerri Aldrich, PT [facility]

## 2022-10-20 ENCOUNTER — HOSPITAL ENCOUNTER (OUTPATIENT)
Dept: PHYSICAL THERAPY | Age: 57
Discharge: HOME | End: 2022-10-20
Payer: OTHER GOVERNMENT

## 2022-10-20 PROCEDURE — 97530 THERAPEUTIC ACTIVITIES: CPT

## 2022-10-20 PROCEDURE — 97112 NEUROMUSCULAR REEDUCATION: CPT

## 2022-10-20 PROCEDURE — 97110 THERAPEUTIC EXERCISES: CPT

## 2022-10-20 NOTE — PROGRESS NOTES
PT DAILY TREATMENT NOTE - [facility]     Patient Name: Nelson Fothergill  Date:10/20/2022  : 1965  [x]  Patient  Verified  Payor: LAUREN / Plan: Gamaliel Cole 74 / Product Type:  /    In time:1010  Out time:1100  Total Treatment Time (min): 50  Total Timed Codes (min): 50     Visit #: 11 of 15    Treatment Area: Pain in left knee [M25.562]    SUBJECTIVE  Pain Level (0-10 scale): 2-3  Any medication changes, allergies to medications, adverse drug reactions, diagnosis change, or new procedure performed?: [x] No    [] Yes (see summary sheet for update)  Subjective functional status/changes:   [] No changes reported    Patient reports independence wit exercise and that she is ready to manage her condition on her own. OBJECTIVE    25 min Therapeutic Exercise:  [x] See flow sheet :   Rationale: increase ROM, increase strength and decrease pain to improve the patients ability to complete ADLs    15 min Therapeutic Activity:  [x]  See flow sheet :   Rationale: increase ROM, increase strength and improve coordination  to improve the patients ability to complete ADLs     10 min Neuromuscular Re-education:  [x]  See flow sheet :   Rationale: improve coordination, improve balance and increase proprioception  to improve the patients ability to complete ADLs          With   [x] TE   [] TA   [] neuro   [] other: Patient Education: [x] Review HEP    [] Progressed/Changed HEP based on:   [] positioning   [] body mechanics   [] transfers   [] heat/ice application    [] other:      Other Objective/Functional Measures:   FOTO 84  MMT 5/5  STS  x 10 with 11#     Pain Level (0-10 scale) post treatment: 2    ASSESSMENT/Changes in Function:     Patient responds well to treatment session. No adverse effects were noted from today's treatment session. Patient is being discharged as she has met or partially met 100% of her short and long term goals.  She has developed strength and functional mobility resulting in increased activity tolerance. Patient was compliant with HEP and plans to reduce future episodes of care with participation in an independent exercise program. Provided HEP and resistance band to promote seamless transition to independent management of her condition. []  See Plan of Care  []  See progress note/recertification  [x]  See Discharge Summary         Progress towards goals / Updated goals:  Short Term Goals: To be accomplished in 4 weeks:                 Patient will report compliance with HEP at least 1x/day to aid in rehabilitation program.                 Status at IE: Provided initial HEP                 Current: Met, 9/26/2022     Long Term Goals: To be accomplished in 8 weeks:                 Patient will increase strength to 5/5 throughout bilateral LEs to aid in completion of ADLs. Status at IE: 4-/5                 Current Met, 5/5, 10/2022                    Patient will report pain less than 1-2/10 average to aid in completion of ADLs. Status at IE:5/10                 Current: Partially met, 0-4/10 daily, 10/20/2022                    Patient will perform 10 pain free kettle bell squats with 11lbs with good form/technique to aid in completion of ADLs. Status at [address] with STS                 Current: Met, 2 x 10 with 11#, 10/20/2022     Patient will improve FOTO to 80 points overall to demonstrate improvement in functional ability. Status at IE:66                 Current: Met, 84, 10/6/2022    PLAN  []  Upgrade activities as tolerated     []  Continue plan of care  []  Update interventions per flow sheet       [x]  Discharge due to:Goals Met  []  Other:_      Gerry Parson PT, DPT 10/20/2022  10:19 AM    No future appointments.

## 2022-10-20 NOTE — PROGRESS NOTES
Physical Therapy Discharge Instructions    [facility] at [facility], [address] [place] [address]  Phone: [phone]   Fax: [phone]      Patient: Brennon Oro  : 1965    Continue Home Exercise Program 3 times per week             Follow up with MD:     [] Upon completion of therapy     [x] As needed      Recommendations:     [x]   Return to activity with home program    []   Return to activity with the following modifications:       []Post Rehab Program    []Join Independent aquatic program     [x]Return to/join local gym      Additional Comments: Please contact clinic at above phone number if you have any questions regarding Home Exercise Program or self care instructions.       Santa Ribera PT, DPT 10/20/2022 10:49 AM

## 2023-02-18 NOTE — PROGRESS NOTES
[facility] at [address] [place] [address] Drive: [phone]   Fax: [phone]  Discharge Summary  Patient Name: Richelle Lynn : 1965   Medical   Diagnosis: Pain in left knee [M25.562] Treatment Diagnosis: Left knee pain   Onset Date: 8/10/2022     Referral Source: Bette Kemp [facility]): 10/20/2022   Prior Hospitalization: See medical history Provider #: [id number]   Prior Level of Function: walking, gardening, yard work   Comorbidities: HTN, Visual impaired   Medications: Verified on Patient Summary List      ===========================================================================================  Assessment / Summary of Care:  Richelle Lynn is a 62 y.o. female with left knee pain. Patient is being discharged as she has met or partially met 100% of her short and long term goals. She has developed strength and functional mobility resulting in increased activity tolerance. Patient was compliant with HEP and plans to reduce future episodes of care with participation in an independent exercise program. Provided HEP and resistance band to promote seamless transition to independent management of her condition.     ===========================================================================================    Plan: Discharge to independent HEP. Goals:   Short Term Goals: To be accomplished in 4 weeks:                 Patient will report compliance with HEP at least 1x/day to aid in rehabilitation program.                 Status at IE: Provided initial HEP                 Current: Met, 2022     Long Term Goals: To be accomplished in 8 weeks:                 Patient will increase strength to 5/5 throughout bilateral LEs to aid in completion of ADLs. Status at IE: 4-/                 Current Met, , 10/2022                    Patient will report pain less than 1-2/10 average to aid in completion of ADLs. Status at IE:5/10                 Current: Partially met, 0-4/10 daily, 10/20/2022                    Patient will perform 10 pain free kettle bell squats with 11lbs with good form/technique to aid in completion of ADLs. Status at [address] with STS                 Current: Met, 2 x 10 with 11#, 10/20/2022     Patient will improve FOTO to 80 points overall to demonstrate improvement in functional ability.                  Status at IE:66                 Current: Met, 84, 10/6/2022       ===========================================================================================  Subjective: Patient reports independence wit exercise and that she is ready to manage her condition on her own    Objective:   FOTO 84  MMT 5/5  STS  x 10 with 11#    Therapist Signature: Jaylene Orourke PT, DPT Date: 10/20/2022     Time: 11:12 AM Home

## 2023-04-19 ENCOUNTER — HOSPITAL ENCOUNTER (OUTPATIENT)
Facility: HOSPITAL | Age: 58
Setting detail: RECURRING SERIES
Discharge: HOME | End: 2023-04-22
Payer: OTHER GOVERNMENT

## 2023-04-19 PROCEDURE — 97112 NEUROMUSCULAR REEDUCATION: CPT

## 2023-04-19 PROCEDURE — 97110 THERAPEUTIC EXERCISES: CPT

## 2023-04-19 PROCEDURE — 97530 THERAPEUTIC ACTIVITIES: CPT

## 2023-04-19 NOTE — PROGRESS NOTES
PHYSICAL / OCCUPATIONAL THERAPY - DAILY TREATMENT NOTE (updated )    Patient Name: Elida Parada    Date: 2023    : 1965  Insurance: Payor:  EAST / Plan: [employer] EAST / Product Type: *No Product type* /      Patient  verified Yes   Visit #   Current / Total 2 15   Time   In / Out 1450 1530   Pain   In / Out 1 1   Subjective Functional Status/Changes: Patient reports that she is having less pain but occasionally has a popping sensation that is painful. Changes to:  Meds, Allergies, Med Hx, Sx Hx? If yes, update Summary List no       TREATMENT AREA =  Pain in left shoulder [M25.512]    OBJECTIVE  Therapeutic Procedures: Tx Min Billable or 1:1 Min (if diff from Tx Min) Procedure, Rationale, Specifics   20  13502 Therapeutic Exercise (timed):  increase ROM, strength, coordination, balance, and proprioception to improve patient's ability to progress to PLOF and address remaining functional goals. (see flow sheet as applicable)     Details if applicable:       10  90025 Therapeutic Activity (timed):  use of dynamic activities replicating functional movements to increase ROM, strength, coordination, balance, and proprioception in order to improve patient's ability to progress to PLOF and address remaining functional goals. (see flow sheet as applicable)     Details if applicable:     10  84903 Neuromuscular Re-Education (timed):  improve balance, coordination, kinesthetic sense, posture, core stability and proprioception to improve patient's ability to develop conscious control of individual muscles and awareness of position of extremities in order to progress to PLOF and address remaining functional goals.  (see flow sheet as applicable)     Details if applicable:     36  [facility] BC Totals Reminder: bill using total billable min of TIMED therapeutic procedures (example: do not include dry needle or estim unattended, both untimed codes, in totals to left)  8-22 min = 1 unit; 23-37 min = 2 units;

## 2023-04-26 ENCOUNTER — HOSPITAL ENCOUNTER (OUTPATIENT)
Facility: HOSPITAL | Age: 58
Setting detail: RECURRING SERIES
Discharge: HOME | End: 2023-04-29
Payer: OTHER GOVERNMENT

## 2023-04-26 PROCEDURE — 97112 NEUROMUSCULAR REEDUCATION: CPT

## 2023-04-26 PROCEDURE — 97110 THERAPEUTIC EXERCISES: CPT

## 2023-04-26 PROCEDURE — 97530 THERAPEUTIC ACTIVITIES: CPT

## 2023-04-26 NOTE — PROGRESS NOTES
PHYSICAL / OCCUPATIONAL THERAPY - DAILY TREATMENT NOTE (updated )    Patient Name: Mario Leyva    Date: 2023    : 1965  Insurance: Payor: [employer] EAST / Plan: [employer] EAST / Product Type: *No Product type* /      Patient  verified Yes   Visit #   Current / Total 3 15   Time   In / Out 10:50 am 11:28 am   Pain   In / Out 6 4-5   Subjective Functional Status/Changes: Pt reports her shoulder is \"flared up\" from having been out of town and holding her grandkids a lot. Changes to:  Meds, Allergies, Med Hx, Sx Hx? If yes, update Summary List no       TREATMENT AREA =  Pain in left shoulder [M25.512]    OBJECTIVE  Therapeutic Procedures: Tx Min Billable or 1:1 Min (if diff from Tx Min) Procedure, Rationale, Specifics   18  09407 Therapeutic Exercise (timed):  increase ROM, strength, coordination, balance, and proprioception to improve patient's ability to progress to PLOF and address remaining functional goals. (see flow sheet as applicable)     Details if applicable:       10  48623 Therapeutic Activity (timed):  use of dynamic activities replicating functional movements to increase ROM, strength, coordination, balance, and proprioception in order to improve patient's ability to progress to PLOF and address remaining functional goals. (see flow sheet as applicable)     Details if applicable:     10  03014 Neuromuscular Re-Education (timed):  improve balance, coordination, kinesthetic sense, posture, core stability and proprioception to improve patient's ability to develop conscious control of individual muscles and awareness of position of extremities in order to progress to PLOF and address remaining functional goals.  (see flow sheet as applicable)     Details if applicable:     45  [facility] BC Totals Reminder: bill using total billable min of TIMED therapeutic procedures (example: do not include dry needle or estim unattended, both untimed codes, in totals to left)  8-22 min = 1 unit; 23-37 min = 2

## 2023-04-28 ENCOUNTER — APPOINTMENT (OUTPATIENT)
Facility: HOSPITAL | Age: 58
End: 2023-04-28
Payer: OTHER GOVERNMENT

## 2023-05-01 ENCOUNTER — HOSPITAL ENCOUNTER (OUTPATIENT)
Facility: HOSPITAL | Age: 58
Setting detail: RECURRING SERIES
Discharge: HOME | End: 2023-05-04
Payer: OTHER GOVERNMENT

## 2023-05-01 PROCEDURE — 97110 THERAPEUTIC EXERCISES: CPT

## 2023-05-01 PROCEDURE — 97112 NEUROMUSCULAR REEDUCATION: CPT

## 2023-05-01 PROCEDURE — 97530 THERAPEUTIC ACTIVITIES: CPT

## 2023-05-01 NOTE — PROGRESS NOTES
min = 3 units; 53-67 min = 4 units; 68-82 min = 5 units   Total Total     [x]  Patient Education billed concurrently with other procedures   [x] Review HEP    [] Progressed/Changed HEP, detail:    [] Other detail:       Objective Information/Functional Measures/Assessment    Increased exericse intensity as nathan demonstrated improved suotonomy with exercise. She was challenged with exericse prescriebd. Will adance HEP next visit. Patient will continue to benefit from skilled PT / OT services to modify and progress therapeutic interventions, analyze and address functional mobility deficits, analyze and address ROM deficits, analyze and address strength deficits, analyze and address soft tissue restrictions, analyze and cue for proper movement patterns, analyze and modify for postural abnormalities, analyze and address imbalance/dizziness, and instruct in home and community integration to address functional deficits and attain remaining goals. Progress toward goals / Updated goals:  []  See Progress Note/Recertification    Short Term Goals: To be accomplished in 4 weeks:                 Patient will report compliance with HEP at least 1x/day to aid in rehabilitation program.                 Status at IE: provided initial HEP                 Current: In-progress, reviewed HEP, 4/19/2023                    Patient will display full left pain free AROM into flexion and abduction to aid in completion of ADLs. Status at IE: pain 6-7/10                 Current: In-progress, 4/10, 5/1/2023        Long Term Goals: To be accomplished in 8 weeks:                 Patient will report compliance with HEP a least 3-4x/week to aid in rehabilitation/strengthening program.                 Status at IE: NA                 Current:Same as IE                    Patient will report no pain greater than 0-3/10 with overhead activities to aid in completion of ADLs.                  Status at IE: 2-7/10

## 2023-05-03 ENCOUNTER — HOSPITAL ENCOUNTER (OUTPATIENT)
Facility: HOSPITAL | Age: 58
Setting detail: RECURRING SERIES
Discharge: HOME | End: 2023-05-06
Payer: OTHER GOVERNMENT

## 2023-05-03 PROCEDURE — 97112 NEUROMUSCULAR REEDUCATION: CPT

## 2023-05-03 PROCEDURE — 97110 THERAPEUTIC EXERCISES: CPT

## 2023-05-03 PROCEDURE — 97530 THERAPEUTIC ACTIVITIES: CPT

## 2023-05-03 NOTE — PROGRESS NOTES
PHYSICAL / OCCUPATIONAL THERAPY - DAILY TREATMENT NOTE (updated )    Patient Name: Kristen Son    Date: 5/3/2023    : 1965  Insurance: Payor:  EAST / Plan: [employer] EAST / Product Type: *No Product type* /      Patient  verified Yes   Visit #   Current / Total 5 15   Time   In / Out 0930 1008   Pain   In / Out 1 1   Subjective Functional Status/Changes: Patient reports that her shoulder is feeling a little better today. Changes to:  Meds, Allergies, Med Hx, Sx Hx? If yes, update Summary List no       TREATMENT AREA =  Pain in left shoulder [M25.512]    OBJECTIVE  Therapeutic Procedures: Tx Min Billable or 1:1 Min (if diff from Tx Min) Procedure, Rationale, Specifics   20  38478 Therapeutic Exercise (timed):  increase ROM, strength, coordination, balance, and proprioception to improve patient's ability to progress to PLOF and address remaining functional goals. (see flow sheet as applicable)     Details if applicable:       10  80751 Therapeutic Activity (timed):  use of dynamic activities replicating functional movements to increase ROM, strength, coordination, balance, and proprioception in order to improve patient's ability to progress to PLOF and address remaining functional goals. (see flow sheet as applicable)     Details if applicable:     8  58043 Neuromuscular Re-Education (timed):  improve balance, coordination, kinesthetic sense, posture, core stability and proprioception to improve patient's ability to develop conscious control of individual muscles and awareness of position of extremities in order to progress to PLOF and address remaining functional goals.  (see flow sheet as applicable)     Details if applicable:     45  [facility] Totals Reminder: bill using total billable min of TIMED therapeutic procedures (example: do not include dry needle or estim unattended, both untimed codes, in totals to left)  8-22 min = 1 unit; 23-37 min = 2 units; 38-52 min = 3 units; 53-67 min = 4

## 2023-05-09 ENCOUNTER — HOSPITAL ENCOUNTER (OUTPATIENT)
Facility: HOSPITAL | Age: 58
Setting detail: RECURRING SERIES
Discharge: HOME | End: 2023-05-12
Payer: OTHER GOVERNMENT

## 2023-05-09 PROCEDURE — 97112 NEUROMUSCULAR REEDUCATION: CPT

## 2023-05-09 PROCEDURE — 97530 THERAPEUTIC ACTIVITIES: CPT

## 2023-05-09 PROCEDURE — 97110 THERAPEUTIC EXERCISES: CPT

## 2023-05-09 NOTE — PROGRESS NOTES
completion of ADLs. Status at IE: 2-7/10                 Current:Same as IE                    Patient will increase bilateral UE strength to 4+/5 throughout all planes to aid in completion of ADLs. Status at IE: 4-/5                 Current:Same as IE                    Patient will increase FOTO score to 65 points overall to demonstrate improvement in functional status.                   Status at IE: FOTO score = 41 (an established functional score where 100 = no disability)                 Current:Same as IE     PLAN  Yes  Continue plan of care  []  Upgrade activities as tolerated  []  Discharge due to :  []  Other:    Sheryl Darling PT    5/9/2023    10:05 AM    Future Appointments   Date Time Provider Lam Lafleur   5/11/2023  9:30 AM HOWARD Beltran [facility]   5/15/2023  9:30 AM Jeanette Almeida PT [facility]   5/17/2023  9:30 AM HOWARD [facility]tran[facility]   5/22/2023  9:30 AM Jeanette Almeida PT [facility]   5/24/2023  9:30 AM Jeanette Almeida PT [facility]   5/30/2023  9:30 AM HOWARD BeltranHPTDRE [facility]   6/1/2023  9:30 AM Nica Powers [facility]

## 2023-05-11 ENCOUNTER — HOSPITAL ENCOUNTER (OUTPATIENT)
Facility: HOSPITAL | Age: 58
Setting detail: RECURRING SERIES
Discharge: HOME | End: 2023-05-14
Payer: OTHER GOVERNMENT

## 2023-05-11 PROCEDURE — 97112 NEUROMUSCULAR REEDUCATION: CPT

## 2023-05-11 PROCEDURE — 97530 THERAPEUTIC ACTIVITIES: CPT

## 2023-05-11 PROCEDURE — 97110 THERAPEUTIC EXERCISES: CPT

## 2023-05-15 ENCOUNTER — HOSPITAL ENCOUNTER (OUTPATIENT)
Facility: HOSPITAL | Age: 58
Setting detail: RECURRING SERIES
Discharge: HOME | End: 2023-05-18
Payer: OTHER GOVERNMENT

## 2023-05-15 PROCEDURE — 97112 NEUROMUSCULAR REEDUCATION: CPT

## 2023-05-15 PROCEDURE — 97110 THERAPEUTIC EXERCISES: CPT

## 2023-05-15 PROCEDURE — 97530 THERAPEUTIC ACTIVITIES: CPT

## 2023-05-15 NOTE — PROGRESS NOTES
PHYSICAL / OCCUPATIONAL THERAPY - DAILY TREATMENT NOTE (updated )    Patient Name: Cortney Moya    Date: 5/15/2023    : 1965  Insurance: Payor: [employer] EAST / Plan: [employer] EAST / Product Type: *No Product type* /      Patient  verified Yes   Visit #   Current / Total 7 16   Time   In / Out 0920 1010   Pain   In / Out 2 01   Subjective Functional Status/Changes: Patient reports that she is feeling better but has difficulty reaching across her body. Changes to:  Meds, Allergies, Med Hx, Sx Hx? If yes, update Summary List no       TREATMENT AREA =  Pain in left shoulder [M25.512]    OBJECTIVE    Therapeutic Procedures: Tx Min Billable or 1:1 Min (if diff from Tx Min) Procedure, Rationale, Specifics   25  10993 Therapeutic Exercise (timed):  increase ROM, strength, coordination, balance, and proprioception to improve patient's ability to progress to PLOF and address remaining functional goals. (see flow sheet as applicable)     Details if applicable:       15  05851 Therapeutic Activity (timed):  use of dynamic activities replicating functional movements to increase ROM, strength, coordination, balance, and proprioception in order to improve patient's ability to progress to PLOF and address remaining functional goals. (see flow sheet as applicable)     Details if applicable:     10  13571 Neuromuscular Re-Education (timed):  improve balance, coordination, kinesthetic sense, posture, core stability and proprioception to improve patient's ability to develop conscious control of individual muscles and awareness of position of extremities in order to progress to PLOF and address remaining functional goals.  (see flow sheet as applicable)     Details if applicable:     39  [facility] Totals Reminder: bill using total billable min of TIMED therapeutic procedures (example: do not include dry needle or estim unattended, both untimed codes, in totals to left)  8-22 min = 1 unit; 23-37 min = 2 units; 38-52 min = 3

## 2023-05-17 ENCOUNTER — HOSPITAL ENCOUNTER (OUTPATIENT)
Facility: HOSPITAL | Age: 58
Setting detail: RECURRING SERIES
Discharge: HOME | End: 2023-05-20
Payer: OTHER GOVERNMENT

## 2023-05-17 PROCEDURE — 97530 THERAPEUTIC ACTIVITIES: CPT

## 2023-05-17 PROCEDURE — 97110 THERAPEUTIC EXERCISES: CPT

## 2023-05-17 PROCEDURE — 97112 NEUROMUSCULAR REEDUCATION: CPT

## 2023-05-17 NOTE — PROGRESS NOTES
PHYSICAL / OCCUPATIONAL THERAPY - DAILY TREATMENT NOTE (updated )    Patient Name: Sophie Wyman    Date: 2023    : 1965  Insurance: Payor:  EAST / Plan: [employer] EAST / Product Type: *No Product type* /      Patient  verified Yes   Visit #   Current / Total 8 16   Time   In / Out 0920 1010   Pain   In / Out 0-1 0   Subjective Functional Status/Changes: Patient reports that she had minimal soreness after last visit. She states that she was not recommended surgery after her last follow up. Changes to:  Meds, Allergies, Med Hx, Sx Hx? If yes, update Summary List no       TREATMENT AREA =  Pain in left shoulder [M25.512]    OBJECTIVE    Therapeutic Procedures: Tx Min Billable or 1:1 Min (if diff from Tx Min) Procedure, Rationale, Specifics   25  52914 Therapeutic Exercise (timed):  increase ROM, strength, coordination, balance, and proprioception to improve patient's ability to progress to PLOF and address remaining functional goals. (see flow sheet as applicable)     Details if applicable:       17  27941 Therapeutic Activity (timed):  use of dynamic activities replicating functional movements to increase ROM, strength, coordination, balance, and proprioception in order to improve patient's ability to progress to PLOF and address remaining functional goals. (see flow sheet as applicable)     Details if applicable:     8  62333 Neuromuscular Re-Education (timed):  improve balance, coordination, kinesthetic sense, posture, core stability and proprioception to improve patient's ability to develop conscious control of individual muscles and awareness of position of extremities in order to progress to PLOF and address remaining functional goals.  (see flow sheet as applicable)     Details if applicable:     48  [address] Reminder: bill using total billable min of TIMED therapeutic procedures (example: do not include dry needle or estim unattended, both untimed codes, in totals to left)  8

## 2023-05-22 ENCOUNTER — HOSPITAL ENCOUNTER (OUTPATIENT)
Facility: HOSPITAL | Age: 58
Setting detail: RECURRING SERIES
Discharge: HOME | End: 2023-05-25
Payer: OTHER GOVERNMENT

## 2023-05-22 PROCEDURE — 97530 THERAPEUTIC ACTIVITIES: CPT

## 2023-05-22 PROCEDURE — 97112 NEUROMUSCULAR REEDUCATION: CPT

## 2023-05-22 PROCEDURE — 97110 THERAPEUTIC EXERCISES: CPT

## 2023-05-22 NOTE — PROGRESS NOTES
PHYSICAL / OCCUPATIONAL THERAPY - DAILY TREATMENT NOTE (updated )    Patient Name: Argentina Farmer    Date: 2023    : 1965  Insurance: Payor:  EAST / Plan: [employer] EAST / Product Type: *No Product type* /      Patient  verified Yes   Visit #   Current / Total 10 16   Time   In / Out 0920 1010   Pain   In / Out 0-1 0   Subjective Functional Status/Changes: Patient reports that she is doing well and is compliant with HEP. Changes to:  Meds, Allergies, Med Hx, Sx Hx? If yes, update Summary List no       TREATMENT AREA =  Pain in left shoulder [M25.512]    OBJECTIVE    Therapeutic Procedures: Tx Min Billable or 1:1 Min (if diff from Tx Min) Procedure, Rationale, Specifics   25  90650 Therapeutic Exercise (timed):  increase ROM, strength, coordination, balance, and proprioception to improve patient's ability to progress to PLOF and address remaining functional goals. (see flow sheet as applicable)     Details if applicable:       15  52098 Therapeutic Activity (timed):  use of dynamic activities replicating functional movements to increase ROM, strength, coordination, balance, and proprioception in order to improve patient's ability to progress to PLOF and address remaining functional goals. (see flow sheet as applicable)     Details if applicable:     10  74835 Neuromuscular Re-Education (timed):  improve balance, coordination, kinesthetic sense, posture, core stability and proprioception to improve patient's ability to develop conscious control of individual muscles and awareness of position of extremities in order to progress to PLOF and address remaining functional goals.  (see flow sheet as applicable)     Details if applicable:     48  [facility] Totals Reminder: bill using total billable min of TIMED therapeutic procedures (example: do not include dry needle or estim unattended, both untimed codes, in totals to left)  8-22 min = 1 unit; 23-37 min = 2 units; 38-52 min = 3 units; 53-67 min =

## 2023-05-24 ENCOUNTER — HOSPITAL ENCOUNTER (OUTPATIENT)
Facility: HOSPITAL | Age: 58
Setting detail: RECURRING SERIES
Discharge: HOME | End: 2023-05-27
Payer: OTHER GOVERNMENT

## 2023-05-24 PROCEDURE — 97112 NEUROMUSCULAR REEDUCATION: CPT

## 2023-05-24 PROCEDURE — 97110 THERAPEUTIC EXERCISES: CPT

## 2023-05-24 PROCEDURE — 97530 THERAPEUTIC ACTIVITIES: CPT

## 2023-05-24 NOTE — PROGRESS NOTES
PHYSICAL / OCCUPATIONAL THERAPY - DAILY TREATMENT NOTE (updated )    Patient Name: Adrienne Boswell    Date: 2023    : 1965  Insurance: Payor:  EAST / Plan: [employer] EAST / Product Type: *No Product type* /      Patient  verified Yes   Visit #   Current / Total 11 16   Time   In / Out 0928 1015   Pain   In / Out 2-3 1-2   Subjective Functional Status/Changes: Patient reports that she has increased shoulder soreness from packing a vehicle for a road trip. Changes to:  Meds, Allergies, Med Hx, Sx Hx? If yes, update Summary List no       TREATMENT AREA =  Pain in left shoulder [M25.512]    OBJECTIVE    Therapeutic Procedures: Tx Min Billable or 1:1 Min (if diff from Tx Min) Procedure, Rationale, Specifics   20  24150 Therapeutic Exercise (timed):  increase ROM, strength, coordination, balance, and proprioception to improve patient's ability to progress to PLOF and address remaining functional goals. (see flow sheet as applicable)     Details if applicable:       17  48725 Therapeutic Activity (timed):  use of dynamic activities replicating functional movements to increase ROM, strength, coordination, balance, and proprioception in order to improve patient's ability to progress to PLOF and address remaining functional goals. (see flow sheet as applicable)     Details if applicable:     10  79329 Neuromuscular Re-Education (timed):  improve balance, coordination, kinesthetic sense, posture, core stability and proprioception to improve patient's ability to develop conscious control of individual muscles and awareness of position of extremities in order to progress to PLOF and address remaining functional goals.  (see flow sheet as applicable)     Details if applicable:     52  [facility] Totals Reminder: bill using total billable min of TIMED therapeutic procedures (example: do not include dry needle or estim unattended, both untimed codes, in totals to left)  8-22 min = 1 unit; 23-37 min = 2 units;

## 2023-05-30 ENCOUNTER — APPOINTMENT (OUTPATIENT)
Facility: HOSPITAL | Age: 58
End: 2023-05-30
Payer: OTHER GOVERNMENT

## 2023-06-01 ENCOUNTER — HOSPITAL ENCOUNTER (OUTPATIENT)
Facility: HOSPITAL | Age: 58
Setting detail: RECURRING SERIES
Discharge: HOME | End: 2023-06-04
Payer: OTHER GOVERNMENT

## 2023-06-01 PROCEDURE — 97530 THERAPEUTIC ACTIVITIES: CPT

## 2023-06-01 PROCEDURE — 97112 NEUROMUSCULAR REEDUCATION: CPT

## 2023-06-01 PROCEDURE — 97110 THERAPEUTIC EXERCISES: CPT

## 2023-06-12 ENCOUNTER — APPOINTMENT (OUTPATIENT)
Facility: HOSPITAL | Age: 58
End: 2023-06-12
Payer: OTHER GOVERNMENT

## 2023-06-14 ENCOUNTER — APPOINTMENT (OUTPATIENT)
Facility: HOSPITAL | Age: 58
End: 2023-06-14
Payer: OTHER GOVERNMENT

## 2023-06-19 ENCOUNTER — HOSPITAL ENCOUNTER (OUTPATIENT)
Facility: HOSPITAL | Age: 58
Setting detail: RECURRING SERIES
Discharge: HOME | End: 2023-06-22
Payer: OTHER GOVERNMENT

## 2023-06-19 PROCEDURE — 97110 THERAPEUTIC EXERCISES: CPT

## 2023-06-19 PROCEDURE — 97112 NEUROMUSCULAR REEDUCATION: CPT

## 2023-06-19 PROCEDURE — 97530 THERAPEUTIC ACTIVITIES: CPT

## 2023-06-19 NOTE — PROGRESS NOTES
PHYSICAL / OCCUPATIONAL THERAPY - DAILY TREATMENT NOTE (updated )    Patient Name: Lakesha Marquez    Date: 2023    : 1965  Insurance: Payor:  EAST / Plan:  EAST / Product Type: *No Product type* /      Patient  verified Yes   Visit #   Current / Total 13 16   Time   In / Out 0920 1015   Pain   In / Out 2 1-2   Subjective Functional Status/Changes: Patient reports that she has less pain today. Changes to:  Meds, Allergies, Med Hx, Sx Hx? If yes, update Summary List no       TREATMENT AREA =  Pain in left shoulder [M25.512]    OBJECTIVE    Therapeutic Procedures: Tx Min Billable or 1:1 Min (if diff from Tx Min) Procedure, Rationale, Specifics   25  42075 Therapeutic Exercise (timed):  increase ROM, strength, coordination, balance, and proprioception to improve patient's ability to progress to PLOF and address remaining functional goals. (see flow sheet as applicable)     Details if applicable:       15  55695 Therapeutic Activity (timed):  use of dynamic activities replicating functional movements to increase ROM, strength, coordination, balance, and proprioception in order to improve patient's ability to progress to PLOF and address remaining functional goals. (see flow sheet as applicable)     Details if applicable:     15  04187 Neuromuscular Re-Education (timed):  improve balance, coordination, kinesthetic sense, posture, core stability and proprioception to improve patient's ability to develop conscious control of individual muscles and awareness of position of extremities in order to progress to PLOF and address remaining functional goals.  (see flow sheet as applicable)     Details if applicable:     54  [address] [facility] Reminder: bill using total billable min of TIMED therapeutic procedures (example: do not include dry needle or estim unattended, both untimed codes, in totals to left)  8-22 min = 1 unit; 23-37 min = 2 units; 38-52 min = 3 units; 53-67 min = 4 units; 68-82 min =

## 2023-06-21 ENCOUNTER — HOSPITAL ENCOUNTER (OUTPATIENT)
Facility: HOSPITAL | Age: 58
Setting detail: RECURRING SERIES
Discharge: HOME | End: 2023-06-24
Payer: OTHER GOVERNMENT

## 2023-06-21 PROCEDURE — 97112 NEUROMUSCULAR REEDUCATION: CPT

## 2023-06-21 PROCEDURE — 97110 THERAPEUTIC EXERCISES: CPT

## 2023-06-21 PROCEDURE — 97530 THERAPEUTIC ACTIVITIES: CPT

## 2023-06-21 NOTE — PROGRESS NOTES
PHYSICAL / OCCUPATIONAL THERAPY - DAILY TREATMENT NOTE (updated )    Patient Name: Tiera Gomez    Date: 2023    : 1965  Insurance: Payor:  EAST / Plan: [employer] EAST / Product Type: *No Product type* /      Patient  verified Yes   Visit #   Current / Total 14 16   Time   In / Out 0930 1010   Pain   In / Out 1 1-2   Subjective Functional Status/Changes: Patient reports independence with exercise. Changes to:  Meds, Allergies, Med Hx, Sx Hx? If yes, update Summary List no       TREATMENT AREA =  Pain in left shoulder [M25.512]    OBJECTIVE    Therapeutic Procedures: Tx Min Billable or 1:1 Min (if diff from Tx Min) Procedure, Rationale, Specifics   20  78197 Therapeutic Exercise (timed):  increase ROM, strength, coordination, balance, and proprioception to improve patient's ability to progress to PLOF and address remaining functional goals. (see flow sheet as applicable)     Details if applicable:       10  31356 Therapeutic Activity (timed):  use of dynamic activities replicating functional movements to increase ROM, strength, coordination, balance, and proprioception in order to improve patient's ability to progress to PLOF and address remaining functional goals. (see flow sheet as applicable)     Details if applicable:     10  85820 Neuromuscular Re-Education (timed):  improve balance, coordination, kinesthetic sense, posture, core stability and proprioception to improve patient's ability to develop conscious control of individual muscles and awareness of position of extremities in order to progress to PLOF and address remaining functional goals.  (see flow sheet as applicable)     Details if applicable:     36  [facility] Totals Reminder: bill using total billable min of TIMED therapeutic procedures (example: do not include dry needle or estim unattended, both untimed codes, in totals to left)  8-22 min = 1 unit; 23-37 min = 2 units; 38-52 min = 3 units; 53-67 min = 4 units; 68-82 min = 5

## 2023-06-21 NOTE — PROGRESS NOTES
Physical Therapy Discharge Instructions    [facility] [place], [address] [place] [address]  Phone: [phone]   Fax: [phone]      Patient: Galdino Stoll  : 1965    Continue Home Exercise Program 3-4 times per week           Follow up with MD:     [] Upon completion of therapy     [x] As needed      Recommendations:     [x]   Return to activity with home program    [x]   Return to activity with the following modifications:       []Post Rehab Program    []Join Independent aquatic program     [x]Return to/join local gym      Additional Comments: Please contact clinic at above phone number if you have any questions regarding Home Exercise Program or self care instructions.